# Patient Record
Sex: MALE | Race: BLACK OR AFRICAN AMERICAN | NOT HISPANIC OR LATINO | Employment: FULL TIME | URBAN - METROPOLITAN AREA
[De-identification: names, ages, dates, MRNs, and addresses within clinical notes are randomized per-mention and may not be internally consistent; named-entity substitution may affect disease eponyms.]

---

## 2018-01-11 NOTE — PROGRESS NOTES
Assessment    1  Well adult exam (V70 0) (Z00 00)   2  Screening for depression (V79 0) (Z13 89)   3  Hypercholesterolemia (272 0) (E78 00)   4  Prehypertension (796 2) (R03 0)   5  History of Anxiety (300 00) (F41 9)   6  Diabetes mellitus screening (V77 1) (Z13 1)    Plan  Diabetes mellitus screening, Hypercholesterolemia, Prehypertension    · (1) CBC/PLT/DIFF; Status:Active; Requested [id number];    · (1) COMPREHENSIVE METABOLIC PANEL; Status:Active; Requested [id number];    · (1) HEMOGLOBIN A1C; Status:Active; Requested for:27Oct2016;    · (1) LIPID PANEL FASTING W DIRECT LDL REFLEX; Status:Active; Requested  for:27Oct2016;    · (1) TSH WITH FT4 REFLEX; Status:Active; Requested for:27Oct2016;    · (1) URINALYSIS w URINE C/S REFLEX (will reflex a microscopy if leukocytes, occult  blood, or nitrites are not within normal limits); Status:Active; Requested [id number];   Screening for depression    · *VB-Depression Screening; Status:Complete;   Done: 57GTU1281 06:05PM  Well adult exam    · Call [phone] if: You have any warning signs of skin cancer ; Status:Complete;    Done: 27Oct2016   · Always use a seat belt and shoulder strap when riding or driving a motor vehicle ;  Status:Complete;   Done: 27Oct2016   · Begin or continue regular aerobic exercise   Gradually work up to at least 3 sessions of 30  minutes of exercise a week ; Status:Complete;   Done: 27Oct2016   · Brush your teeth 3 times a day and floss at least once a day ; Status:Complete;   Done:  27Oct2016   · Limit your use of alcohol to 2 drinks or cans of beer a day ; Status:Complete;   Done:  27Oct2016   · Regular aerobic exercise can help reduce stress ; Status:Complete;   Done: 27Oct2016   · There are many ways to reduce your risk of catching or spreading a sexually transmitted  Infection ; Status:Complete;   Done: 64VVJ1503   · Use a sun block product with an SPF of 15 or more ; Status:Complete;   Done:  27Oct2016   · Using a latex condom can help prevent pregnancy  It can also help to prevent the spread  of sexually transmitted infections ; Status:Complete;   Done: 27Oct2016   · We recommend routine visits to a dentist ; Status:Complete;   Done: 71XUL0370   · We recommend that you follow the "Mediterranean diet "; Status:Complete;   Done:  27Oct2016   · We recommend that you follow these rules for gun safety ; Status:Complete;   Done:  27Oct2016    Discussion/Summary  Impression: health maintenance visit  Currently, he eats an adequate diet and has an inadequate exercise regimen  Prostate cancer screening: PSA is not indicated  Testicular cancer screening: self testicular exam technique was taught, monthly self testicular exam was advised, testicular cancer screening is current and clinical testicular exam was done today  Colorectal cancer screening: colorectal cancer screening is not indicated  Screening lab work includes glucose, lipid profile and urinalysis  The immunizations are up to date  Advice and education were given regarding nutrition, aerobic exercise and weight loss  Preventative + overweight + prehypertension + hx hyperlipidemia: Weight loss + dietary measures discussed/encouraged  Slip given for repeat screening labs  Home BP readings advised, calling if consistently > 140/90  UTD with Tdap  Declining flu shot  Employer wellness form completed  RTO 1 year  Chief Complaint  Pt here for annual physical exam      History of Present Illness  HM, Adult Male: The patient is being seen for a health maintenance evaluation  The last health maintenance visit was 2 year(s) ago  Social History: Household members include parents  He is unmarried  Work status: working full time  The patient is a former cigarette smoker  He reports occasional alcohol use, drinking 2-4 drinks per week and denies binge drinking  The patient has no concerns about alcohol abuse  He has never used illicit drugs  General Health:  The patient's health since the last visit is described as good  He has regular dental visits  He denies vision problems  He denies hearing loss  Immunizations status: up to date  Lifestyle:  He consumes a diverse and healthy diet  He has weight concerns  He does not exercise regularly  He does not use tobacco  He consumes alcohol  He reports occasional alcohol use and drinking 2-4 drinks per week  He typically drinks beer  Alcohol concern: The patient has no concerns about alcohol abuse  He denies drug use  Reproductive health:  the patient is sexually active  He denies erectile dysfunction  Screening: cancer screening reviewed and updated  metabolic screening reviewed and updated  risk screening reviewed and updated  HPI:   Here for routine well exam  Needs employer wellness form completed  Feels fine, no complaints  Left thumb pain more or less resolved  Right ankle pops occasionally, but no pain or instability with this  Does not check blood pressure on his own  Both parents with hypertension, otherwise healthy  Still works full time at [employer]  Switched from night shift to day shift  Sleep issues have largely resolved as a result, as has anxiety  Good energy level  Lives with parents  Steady girlfriend  Average diet  Regular fruits and vegetables  Admits to liking potato chips  Recently switched to lower salt version  ETOH remains at 2-4 beers per week  Quit smoking 13 years ago  No regular exercise  Tdap 8/2016  Had recent eye exam  Wears glasses  Never got previously ordered labs done  Review of Systems    Constitutional: recent 5 lb weight gain, but no fever and not feeling tired  Eyes: no eyesight problems  ENT: no sore throat  Cardiovascular: no chest pain, no palpitations and no extremity edema  Respiratory: no cough and no shortness of breath during exertion     Gastrointestinal: no abdominal pain, no nausea, no vomiting, no constipation, no diarrhea and no blood in stools  Genitourinary: no dysuria  Musculoskeletal: as noted in HPI  Integumentary: no rashes  Neurological: no headache and no dizziness  Psychiatric: no anxiety and no depression  Endocrine: no muscle weakness  Hematologic/Lymphatic: no swollen glands  Over the past 2 weeks, how often have you been bothered by the following problems? 1 ) Little interest or pleasure in doing things? Not at all    2 ) Feeling down, depressed or hopeless? Not at all  Active Problems    1  History of Anxiety (300 00) (F41 9)   2  Epidermal inclusion cyst (706 2) (L72 0)   3  Hypercholesterolemia (272 0) (E78 00)   4  History of Injury of left thumb (959 5) (S69 92XA)   5  Need for Tdap vaccination (V06 1) (Z23)   6  Prehypertension (796 2) (R03 0)   7  History of Right knee pain (719 46) (M25 561)    Past Medical History    · History of Anxiety (300 00) (F41 9)   · Diabetes mellitus screening (V77 1) (Z13 1)   · History of allergic rhinitis (V12 69) (Z87 09)   · History of allergic urticaria (V13 3) (Z87 2)   · History of insomnia (V13 89) (Z87 898)   · History of low back pain (V13 59) (Z87 39)   · History of Injury of left thumb (959 5) (A08 20NA)   · Need for Tdap vaccination (V06 1) (Z23)   · Prehypertension (796 2) (R03 0)   · History of Right knee pain (719 46) (M25 561)   · History of Urethral discharge (788 7) (R36 9)   · Well adult exam (V70 0) (Z00 00)    Family History  Mother    · Family history of Hypertension (V17 49)  Father    · Family history of Hypertension (V17 49)  Paternal Grandfather    · Family history of Cancer   · Family history of Colon Cancer (V16 0)    Social History    · Being A Social Drinker   · Former smoker (Y96 08) (N00 497)   · Working Full Time    Current Meds   1  LORazepam 1 MG Oral Tablet; TAKE 1/2 TO 1 TAB TWICE A DAY AS NEEDED FOR   ANXIETY  AVOID WITH ALCOHOL; Therapy: 08MMY5193 to (Last Rx:24Mar2015) Ordered    Allergies    1   No Known Drug Allergies    Vitals   Recorded: 00RYN0519 06:32PM Recorded: 11OCE6520 91:84KI   Systolic 865 919   Diastolic 84 90   Heart Rate  85   Temperature  98 3 F   O2 Saturation  97   Weight  203 lb 4 96 oz     Physical Exam    Constitutional   General appearance: No acute distress, well appearing and well nourished  Head and Face   Head and face: Normal     Eyes   Conjunctiva and lids: No erythema, swelling or discharge  Pupils and irises: Equal, round, reactive to light  Ophthalmoscopic examination: Normal fundi and optic discs  Ears, Nose, Mouth, and Throat   External inspection of ears and nose: Normal     Otoscopic examination: Tympanic membranes translucent with normal light reflex  Canals patent without erythema  Nasal mucosa, septum, and turbinates: Normal without edema or erythema  Lips, teeth, and gums: Normal, good dentition  Oropharynx: Normal with no erythema, edema, exudate or lesions  Neck   Neck: Supple, symmetric, trachea midline, no masses  Thyroid: Normal, no thyromegaly  Pulmonary   Respiratory effort: No increased work of breathing or signs of respiratory distress  Auscultation of lungs: Clear to auscultation  Cardiovascular   Auscultation of heart: Normal rate and rhythm, normal S1 and S2, no murmurs  Carotid pulses: 2+ bilaterally  Pedal pulses: 2+ bilaterally  Examination of extremities for edema and/or varicosities: Normal     Chest   Breasts: Normal, no dimpling or skin changes appreciated  Palpation of breasts and axillae: Normal, no masses palpated  Abdomen   Abdomen: Non-tender, no masses  Liver and spleen: No hepatomegaly or splenomegaly  Examination for hernias: No hernias appreciated  Genitourinary   Scrotal contents: Normal testes, no masses  Penis: Normal, no lesions  Lymphatic   Palpation of lymph nodes in neck: No lymphadenopathy  Musculoskeletal   Gait and station: Normal     Neurologic   Reflexes: 2+ and symmetric  Psychiatric   Orientation to person, place and time: Normal     Mood and affect: Normal        Results/Data  *VB-Depression Screening 27Oct2016 06:05PM Jesus Counter     Test Name Result Flag Reference   Depression Scale Result      Depression Screen - Negative For Symptoms       Signatures   Electronically signed by : FRANK Villalta; Oct 27 2016  6:50PM EST                       (Author)    Electronically signed by : Randee Pennington DO; Oct 28 2016  7:55AM EST                       (Author)

## 2018-01-15 NOTE — RESULT NOTES
Verified Results  * [facility] THUMB LEFT FIRST DIGIT-MIN 2V [id number] 01:52PM Kin MARTELL Order Number: [id number]     Test Name Result Flag Reference   XR THUMB LEFT FIRST DIGIT-MIN 2V (Report)     LEFT THUMB     INDICATION: Laceration in the area of the 1st MCP joint  COMPARISON: None     VIEWS: PA view hand and 2 cone-down views of the 1st digit; 3 images     For the purposes of institution wide universal language the following terms will apply: (thumb=1st digit/finger, index finger=2nd digit/finger, long finger=3rd digit/finger, ring=4th digit/finger and small finger=5th digit/finger)     FINDINGS:     There is no acute fracture or dislocation  No significant degenerative changes  No lytic or blastic lesion  There is dorsal soft tissue swelling at the level of the MCP joint of the left thumb  There are no radiopaque foreign bodies  IMPRESSION:     No fracture  No radiopaque foreign bodies in the left thumb         Workstation performed: [id number]     Signed by:   Annie Mosqueda MD   8/19/16

## 2018-03-22 ENCOUNTER — OFFICE VISIT (OUTPATIENT)
Dept: FAMILY MEDICINE CLINIC | Facility: OTHER | Age: 38
End: 2018-03-22
Payer: COMMERCIAL

## 2018-03-22 VITALS
HEIGHT: 70 IN | HEART RATE: 76 BPM | DIASTOLIC BLOOD PRESSURE: 78 MMHG | SYSTOLIC BLOOD PRESSURE: 134 MMHG | BODY MASS INDEX: 29.13 KG/M2 | RESPIRATION RATE: 98 BRPM | WEIGHT: 203.5 LBS | TEMPERATURE: 99.3 F

## 2018-03-22 DIAGNOSIS — E78.00 HYPERCHOLESTEROLEMIA: ICD-10-CM

## 2018-03-22 DIAGNOSIS — E66.3 OVERWEIGHT: ICD-10-CM

## 2018-03-22 DIAGNOSIS — Z13.1 SCREENING FOR DIABETES MELLITUS: ICD-10-CM

## 2018-03-22 DIAGNOSIS — Z13.220 SCREENING FOR HYPERLIPIDEMIA: ICD-10-CM

## 2018-03-22 DIAGNOSIS — I10 MILD HYPERTENSION: ICD-10-CM

## 2018-03-22 DIAGNOSIS — Z00.00 WELL ADULT EXAM: Primary | ICD-10-CM

## 2018-03-22 PROCEDURE — 99395 PREV VISIT EST AGE 18-39: CPT | Performed by: NURSE PRACTITIONER

## 2018-03-22 NOTE — PROGRESS NOTES
Assessment/Plan:         Diagnoses and all orders for this visit:    Well adult exam + Overweight + Hx Hypercholesterolemia  --Dietary, weight loss measures discussed, encouraged  -     Lipid Panel with Direct LDL reflex; Future  -     HEMOGLOBIN A1C W/ EAG ESTIMATION; Future  -     TSH, 3rd generation with T4 reflex; Future    Mild hypertension (+ FH also)  --At this point, I recommended that he start a low dose antihypertensive, with goal /70 or less  As , a CCB or diuretic would be a good initial choice  He is, however, declining, preferring instead to work on dietary, weight loss interventions which were discussed  In particular I recommended he start a cardio exercise program, cut down on sodium intake, and work on losing weight  Also recommended periodic home BP readings, calling for consistently > 130/80     -     CBC and differential; Future  -     Comprehensive metabolic panel; Future  -     TSH, 3rd generation with T4 reflex; Future  -     Urinalysis with reflex to microscopic; Future    Abdominal wall strain  --Advise avoidance of potentially exacerbating activities  Also moist heat, OTC muscle cream, Motrin prn  --Call if no resolution/worsening, with these measures, over the next 2-3 weeks  Subjective:      Patient ID: Meredith Brar is a 44 y o  male  Here for routine well exam      Only complaint is some "muscle soreness" just below ribs on his right side  Present x 2-3 weeks  Insidious onset without known precipitating injury or activity  Has been having some issues with his right foot--upcoming podiatry visit--and is favoring that side a bit  He wonders if this may be a factor  Job involves some lifting, trunk movements  Continues to work full time days for [employer]  No variation of symptoms with eating  No rash, bulge noted  N/V, abdominal pain, chest pain, shortness of breath, urinary changes       Never got routine blood work done, as ordered previously  No recent home BP checks  Diet remains fair  Some fruits and vegetables  Admits to liking salty foods, potato chips in particular  Lifts weights but no regular cardio activity  Lives with steady girlfriend  No plans for marriage at this time  Quit smoking 15 years ago  ETOH: 2-4 beers per week  No liquor  No drug use  Tdap 8/2016  Wears glasses  Rx UTD  The following portions of the patient's history were reviewed and updated as appropriate: allergies, current medications, past family history, past medical history, past social history, past surgical history and problem list     Review of Systems   Constitutional: Negative for fatigue and fever  HENT: Negative for sore throat  Eyes: Negative for visual disturbance  Respiratory: Negative for cough and shortness of breath  Cardiovascular: Negative for chest pain and palpitations  Gastrointestinal: Negative for abdominal pain, blood in stool, constipation, diarrhea, nausea and vomiting  Genitourinary: Negative for difficulty urinating  Musculoskeletal: Negative for arthralgias  Skin: Negative  Neurological: Negative for dizziness and headaches  Hematological: Negative for adenopathy  Psychiatric/Behavioral: Negative for agitation and dysphoric mood  Objective:      /92 (BP Location: Right arm, Patient Position: Sitting, Cuff Size: Adult)   Pulse 76   Temp 99 3 °F (37 4 °C) (Tympanic)   Resp (!) 98   Ht 5' 10" (1 778 m)   Wt 92 3 kg (203 lb 8 oz)   BMI 29 20 kg/m²          Physical Exam   Constitutional: He is oriented to person, place, and time  He appears well-developed and well-nourished  HENT:   Head: Normocephalic and atraumatic  Right Ear: External ear normal    Left Ear: External ear normal    Nose: Nose normal    Mouth/Throat: Oropharynx is clear and moist    Eyes: Conjunctivae are normal  Pupils are equal, round, and reactive to light  Neck: Normal range of motion  Neck supple  No thyromegaly present  Cardiovascular: Normal rate, regular rhythm, normal heart sounds and intact distal pulses  Pulmonary/Chest: Effort normal and breath sounds normal    Abdominal: Soft  Bowel sounds are normal  He exhibits no mass  There is no rebound and no guarding  Hernia confirmed negative in the right inguinal area and confirmed negative in the left inguinal area  Area of symptomatology (right side, just below lower margin of ribs), with mild tenderness, no visualized or palpable abnormality  Ribs themselves, nontender  Abdomen non-tender elsewhere  No guarding, rebound, CVA tenderness  Negative Cisneros sign  Genitourinary: Testes normal and penis normal    Neurological: He is alert and oriented to person, place, and time  He has normal reflexes  Skin: Skin is warm and dry  Psychiatric: He has a normal mood and affect

## 2018-03-22 NOTE — PATIENT INSTRUCTIONS
Wellness Visit for Adults   WHAT YOU NEED TO KNOW:   What is a wellness visit? A wellness visit is when you see your healthcare provider to get screened for health problems  You can also get advice on how to stay healthy  Write down your questions so you remember to ask them  Ask your healthcare provider how often you should have a wellness visit  What happens at a wellness visit? Your healthcare provider will ask about your health, and your family history of health problems  This includes high blood pressure, heart disease, and cancer  He or she will ask if you have symptoms that concern you, if you smoke, and about your mood  You may also be asked about your intake of medicines, supplements, food, and alcohol  Any of the following may be done:  · Your weight  will be checked  Your height may also be checked so your body mass index (BMI) can be calculated  Your BMI shows if you are at a healthy weight  · Your blood pressure  and heart rate will be checked  Your temperature may also be checked  · Blood and urine tests  may be done  Blood tests may be done to check your cholesterol levels  Abnormal cholesterol levels increase your risk for heart disease and stroke  You may also need a blood or urine test to check for diabetes if you are at increased risk  Urine tests may be done to look for signs of an infection or kidney disease  · A physical exam  includes checking your heartbeat and lungs with a stethoscope  Your healthcare provider may also check your skin to look for sun damage  · Screening tests  may be recommended  A screening test is done to check for diseases that may not cause symptoms  The screening tests you may need depend on your age, gender, family history, and lifestyle habits  For example, colorectal screening may be recommended if you are 48years old or older  What screening tests do I need if I am a woman? · A Pap smear  is used to screen for cervical cancer   Pap smears are usually done every 3 to 5 years depending on your age  You may need them more often if you have had abnormal Pap smear test results in the past  Ask your healthcare provider how often you should have a Pap smear  · A mammogram  is an x-ray of your breasts to screen for breast cancer  Experts recommend mammograms every 2 years starting at age 48 years  You may need a mammogram at age 52 years or younger if you have an increased risk for breast cancer  Talk to your healthcare provider about when you should start having mammograms and how often you need them  What vaccines might I need? · Get an influenza vaccine  every year  The influenza vaccine protects you from the flu  Several types of viruses cause the flu  The viruses change over time, so new vaccines are made each year  · Get a tetanus-diphtheria (Td) booster vaccine  every 10 years  This vaccine protects you against tetanus and diphtheria  Tetanus is a severe infection that may cause painful muscle spasms and lockjaw  Diphtheria is a severe bacterial infection that causes a thick covering in the back of your mouth and throat  · Get a human papillomavirus (HPV) vaccine  if you are female and aged 23 to 32 or male 23 to 24 and never received it  This vaccine protects you from HPV infection  HPV is the most common infection spread by sexual contact  HPV may also cause vaginal, penile, and anal cancers  · Get a pneumococcal vaccine  if you are aged 72 years or older  The pneumococcal vaccine is an injection given to protect you from pneumococcal disease  Pneumococcal disease is an infection caused by pneumococcal bacteria  The infection may cause pneumonia, meningitis, or an ear infection  · Get a shingles vaccine  if you are aged 61 or older, even if you have had shingles before  The shingles vaccine is an injection to protect you from the varicella-zoster virus  This is the same virus that causes chickenpox   Shingles is a painful rash that develops in people who had chickenpox or have been exposed to the virus  How can I eat healthy? My Plate is a model for planning healthy meals  It shows the types and amounts of foods that should go on your plate  Fruits and vegetables make up about half of your plate, and grains and protein make up the other half  A serving of dairy is included on the side of your plate  The amount of calories and serving sizes you need depends on your age, gender, weight, and height  Examples of healthy foods are listed below:  · Eat a variety of vegetables  such as dark green, red, and orange vegetables  You can also include canned vegetables low in sodium (salt) and frozen vegetables without added butter or sauces  · Eat a variety of fresh fruits , canned fruit in 100% juice, frozen fruit, and dried fruit  · Include whole grains  At least half of the grains you eat should be whole grains  Examples include whole-wheat bread, wheat pasta, brown rice, and whole-grain cereals such as oatmeal     · Eat a variety of protein foods such as seafood (fish and shellfish), lean meat, and poultry without skin (turkey and chicken)  Examples of lean meats include pork leg, shoulder, or tenderloin, and beef round, sirloin, tenderloin, and extra lean ground beef  Other protein foods include eggs and egg substitutes, beans, peas, soy products, nuts, and seeds  · Choose low-fat dairy products such as skim or 1% milk or low-fat yogurt, cheese, and cottage cheese  · Limit unhealthy fats  such as butter, hard margarine, and shortening  How much exercise do I need? Exercise at least 30 minutes per day on most days of the week  Some examples of exercise include walking, biking, dancing, and swimming  You can also fit in more physical activity by taking the stairs instead of the elevator or parking farther away from stores  Include muscle strengthening activities 2 days each week  Regular exercise provides many health benefits  It helps you manage your weight, and decreases your risk for type 2 diabetes, heart disease, stroke, and high blood pressure  Exercise can also help improve your mood  Ask your healthcare provider about the best exercise plan for you  What are some general health and safety guidelines I should follow? · Do not smoke  Nicotine and other chemicals in cigarettes and cigars can cause lung damage  Ask your healthcare provider for information if you currently smoke and need help to quit  E-cigarettes or smokeless tobacco still contain nicotine  Talk to your healthcare provider before you use these products  · Limit alcohol  A drink of alcohol is 12 ounces of beer, 5 ounces of wine, or 1½ ounces of liquor  · Lose weight, if needed  Being overweight increases your risk of certain health conditions  These include heart disease, high blood pressure, type 2 diabetes, and certain types of cancer  · Protect your skin  Do not sunbathe or use tanning beds  Use sunscreen with a SPF 15 or higher  Apply sunscreen at least 15 minutes before you go outside  Reapply sunscreen every 2 hours  Wear protective clothing, hats, and sunglasses when you are outside  · Drive safely  Always wear your seatbelt  Make sure everyone in your car wears a seatbelt  A seatbelt can save your life if you are in an accident  Do not use your cell phone when you are driving  This could distract you and cause an accident  Pull over if you need to make a call or send a text message  · Practice safe sex  Use latex condoms if are sexually active and have more than one partner  Your healthcare provider may recommend screening tests for sexually transmitted infections (STIs)  · Wear helmets, lifejackets, and protective gear  Always wear a helmet when you ride a bike or motorcycle, go skiing, or play sports that could cause a head injury  Wear protective equipment when you play sports   Wear a lifejacket when you are on a boat or doing water sports  CARE AGREEMENT:   You have the right to help plan your care  Learn about your health condition and how it may be treated  Discuss treatment options with your caregivers to decide what care you want to receive  You always have the right to refuse treatment  The above information is an  only  It is not intended as medical advice for individual conditions or treatments  Talk to your doctor, nurse or pharmacist before following any medical regimen to see if it is safe and effective for you  © 2017 [employer]  Information is for End User's use only and may not be sold, redistributed or otherwise used for commercial purposes  All illustrations and images included in CareNotes® are the copyrighted property of [employer]  or [employer]  Chronic Hypertension   WHAT YOU NEED TO KNOW:   Hypertension is high blood pressure (BP)  Your BP is the force of your blood moving against the walls of your arteries  Normal BP is less than 120/80  Prehypertension is between 120/80 and 139/89  Hypertension is 140/90 or higher  Hypertension causes your BP to get so high that your heart has to work much harder than normal  This can damage your heart  Chronic hypertension is a long-term condition that you can control with a healthy lifestyle or medicines  A controlled blood pressure helps protect your organs, such as your heart, lungs, brain, and kidneys  DISCHARGE INSTRUCTIONS:   Call 911 for any of the following:   · You have discomfort in your chest that feels like squeezing, pressure, fullness, or pain  · You become confused or have difficulty speaking  · You suddenly feel lightheaded or have trouble breathing  · You have pain or discomfort in your back, neck, jaw, stomach, or arm  Return to the emergency department if:   · You have a severe headache or vision loss  · You have weakness in an arm or leg    Contact your healthcare provider if:   · You feel faint, dizzy, confused, or drowsy  · You have been taking your BP medicine and your BP is still higher than your healthcare provider says it should be  · You have questions or concerns about your condition or care  Medicines: You may need any of the following:  · Medicine  may be used to help lower your BP  You may need more than one type of medicine  Take the medicine exactly as directed  · Diuretics  help decrease extra fluid that collects in your body  This will help lower your BP  You may urinate more often while you take this medicine  · Cholesterol medicine  helps lower your cholesterol level  A low cholesterol level helps prevent heart disease and makes it easier to control your blood pressure  · Take your medicine as directed  Contact your healthcare provider if you think your medicine is not helping or if you have side effects  Tell him or her if you are allergic to any medicine  Keep a list of the medicines, vitamins, and herbs you take  Include the amounts, and when and why you take them  Bring the list or the pill bottles to follow-up visits  Carry your medicine list with you in case of an emergency  Follow up with your healthcare provider as directed: You will need to return to have your blood pressure checked and to have other lab tests done  Write down your questions so you remember to ask them during your visits  Manage chronic hypertension:  Talk with your healthcare provider about these and other ways to manage hypertension:  · Take your BP at home  Sit and rest for 5 minutes before you take your BP  Extend your arm and support it on a flat surface  Your arm should be at the same level as your heart  Follow the directions that came with your BP monitor  If possible, take at least 2 BP readings each time  Take your BP at least twice a day at the same times each day, such as morning and evening  Keep a record of your BP readings and bring it to your follow-up visits   Ask your healthcare provider what your blood pressure should be  · Limit sodium (salt) as directed  Too much sodium can affect your fluid balance  Check labels to find low-sodium or no-salt-added foods  Some low-sodium foods use potassium salts for flavor  Too much potassium can also cause health problems  Your healthcare provider will tell you how much sodium and potassium are safe for you to have in a day  He or she may recommend that you limit sodium to 2,300 mg a day  · Follow the meal plan recommended by your healthcare provider  A dietitian or your provider can give you more information on low-sodium plans or the DASH (Dietary Approaches to Stop Hypertension) eating plan  The DASH plan is low in sodium, unhealthy fats, and total fat  It is high in potassium, calcium, and fiber  · Exercise to maintain a healthy weight  Exercise at least 30 minutes per day, on most days of the week  This will help decrease your blood pressure  Ask about the best exercise plan for you  · Decrease stress  This may help lower your BP  Learn ways to relax, such as deep breathing or listening to music  · Limit alcohol  Women should limit alcohol to 1 drink a day  Men should limit alcohol to 2 drinks a day  A drink of alcohol is 12 ounces of beer, 5 ounces of wine, or 1½ ounces of liquor  · Do not smoke  Nicotine and other chemicals in cigarettes and cigars can increase your BP and also cause lung damage  Ask your healthcare provider for information if you currently smoke and need help to quit  E-cigarettes or smokeless tobacco still contain nicotine  Talk to your healthcare provider before you use these products  © 2017 [address] [facility] Information is for End User's use only and may not be sold, redistributed or otherwise used for commercial purposes  All illustrations and images included in CareNotes® are the copyrighted property of [employer]  or [employer]    The above information is an  only  It is not intended as medical advice for individual conditions or treatments  Talk to your doctor, nurse or pharmacist before following any medical regimen to see if it is safe and effective for you

## 2018-05-03 ENCOUNTER — OFFICE VISIT (OUTPATIENT)
Dept: OBGYN CLINIC | Facility: HOSPITAL | Age: 38
End: 2018-05-03
Payer: COMMERCIAL

## 2018-05-03 VITALS
BODY MASS INDEX: 29.35 KG/M2 | HEIGHT: 70 IN | WEIGHT: 205 LBS | HEART RATE: 97 BPM | SYSTOLIC BLOOD PRESSURE: 128 MMHG | DIASTOLIC BLOOD PRESSURE: 85 MMHG

## 2018-05-03 DIAGNOSIS — G56.21 CUBITAL TUNNEL SYNDROME ON RIGHT: Primary | ICD-10-CM

## 2018-05-03 PROCEDURE — 99203 OFFICE O/P NEW LOW 30 MIN: CPT | Performed by: PHYSICIAN ASSISTANT

## 2018-05-03 RX ORDER — METHYLPREDNISOLONE 4 MG/1
TABLET ORAL
Qty: 21 TABLET | Refills: 0 | Status: SHIPPED | OUTPATIENT
Start: 2018-05-03 | End: 2020-03-02

## 2018-05-03 NOTE — PROGRESS NOTES
Assessment/Plan   Diagnoses and all orders for this visit:    Cubital tunnel syndrome on right  -     EMG 1 Upper/1 Lower Neuropathy; Future  -     Methylprednisolone 4 MG TBPK; Use as directed on package          Subjective   Patient ID: Julienne Barnard is a 44 y o  male  Vitals:    05/03/18 0948   BP: 128/85   Pulse: 80     36yo male comes in for an evaluation of his right arm  For about a month, he has been having numbness and paresthesias in the ulnar forearm and ring and small fingers  No injury  This is worst in the am when he wakes up and it also increases if he has his elbow flexed for an extended period, such as with computer work  He occasionally gets similar symptoms on the left, but these are much milder, transient, and not present now  No symptoms ever in the upper arm or neck  He has noticed some weakness but is not dropping things  He works as a   The pain is dull, minimal, and radiates from the elbow to the lateral fingers  He has not had any treatment  He reports he had a normal xray of the elbow  The following portions of the patient's history were reviewed and updated as appropriate: allergies, current medications, past family history, past medical history, past social history, past surgical history and problem list     Review of Systems  Ortho Exam  Past Medical History:   Diagnosis Date    Anxiety     last assessed: 10/27/2016    Epidermal inclusion cyst     last assessed: 10/7/2016    Hypercholesterolemia 08/20/2008    last assessed: 10/27/2016    Prehypertension     last assessed: 10/27/2016     History reviewed  No pertinent surgical history    Family History   Problem Relation Age of Onset    Hypertension Mother     Hypertension Father     Cancer Paternal Grandfather     Colon cancer Paternal Grandfather      Social History     Occupational History    working full time      Social History Main Topics    Smoking status: Former Smoker    Smokeless tobacco: Former User    Alcohol use Yes      Comment: being a social drinker    Drug use: Unknown    Sexual activity: Not on file       Review of Systems   Constitutional: Negative  HENT: Negative  Eyes: Negative  Respiratory: Negative  Cardiovascular: Negative  Gastrointestinal: Negative  Endocrine: Negative  Genitourinary: Negative  Musculoskeletal: As below      Allergic/Immunologic: Negative  Neurological: Negative  Hematological: Negative  Psychiatric/Behavioral: Negative          Objective   Physical Exam            · Constitutional: Awake, Alert, Oriented  · Eyes: EOMI  · Psych: Mood and affect appropriate  · Heart: regular rate and rhythm  · Lungs: No audible wheezing  · Abdomen: soft  · Lymph: no lymphedema       bilateral Elbow:  - Appearance   No swelling, discoloration, deformity, or ecchymosis  - Palpation   "No tenderness to palpation of the medial / lateral epicondyles, olecranon, radial head, or anterior elbow  - ROM   Extension: 0 and Flexion: 130  - Motor   normal 5/5 in all planes, elbows and wrists  - Special Tests   No instability with valgus / varus stress or pain with resisted wrist motion  - Mildly diminished sensation to light touch in the right ring and small fingers, negative tinel's at the wrist and also negative at the elbows

## 2018-05-07 ENCOUNTER — HOSPITAL ENCOUNTER (EMERGENCY)
Facility: HOSPITAL | Age: 38
Discharge: HOME | End: 2018-05-07
Attending: EMERGENCY MEDICINE
Payer: COMMERCIAL

## 2018-05-07 ENCOUNTER — APPOINTMENT (EMERGENCY)
Dept: RADIOLOGY | Facility: HOSPITAL | Age: 38
End: 2018-05-07
Payer: COMMERCIAL

## 2018-05-07 VITALS
OXYGEN SATURATION: 96 % | DIASTOLIC BLOOD PRESSURE: 80 MMHG | HEART RATE: 102 BPM | BODY MASS INDEX: 30.21 KG/M2 | WEIGHT: 210.54 LBS | TEMPERATURE: 98.1 F | RESPIRATION RATE: 20 BRPM | SYSTOLIC BLOOD PRESSURE: 166 MMHG

## 2018-05-07 DIAGNOSIS — R06.02 SHORTNESS OF BREATH: Primary | ICD-10-CM

## 2018-05-07 PROCEDURE — 71046 X-RAY EXAM CHEST 2 VIEWS: CPT

## 2018-05-07 PROCEDURE — 93005 ELECTROCARDIOGRAM TRACING: CPT

## 2018-05-07 PROCEDURE — 99285 EMERGENCY DEPT VISIT HI MDM: CPT

## 2018-05-07 RX ORDER — AZITHROMYCIN 250 MG/1
TABLET, FILM COATED ORAL
Qty: 6 TABLET | Refills: 0 | Status: SHIPPED | OUTPATIENT
Start: 2018-05-07 | End: 2018-05-07

## 2018-05-07 RX ORDER — AZITHROMYCIN 250 MG/1
250 TABLET, FILM COATED ORAL EVERY 24 HOURS
Qty: 4 TABLET | Refills: 0 | Status: SHIPPED | OUTPATIENT
Start: 2018-05-07 | End: 2018-05-11

## 2018-05-07 RX ORDER — AZITHROMYCIN 250 MG/1
500 TABLET, FILM COATED ORAL ONCE
Status: COMPLETED | OUTPATIENT
Start: 2018-05-07 | End: 2018-05-07

## 2018-05-07 RX ORDER — SUCRALFATE ORAL 1 G/10ML
1000 SUSPENSION ORAL ONCE
Status: COMPLETED | OUTPATIENT
Start: 2018-05-07 | End: 2018-05-07

## 2018-05-07 RX ADMIN — SUCRALFATE 1000 MG: 1 SUSPENSION ORAL at 02:59

## 2018-05-07 RX ADMIN — AZITHROMYCIN 500 MG: 250 TABLET, FILM COATED ORAL at 04:07

## 2018-05-07 NOTE — ED PROVIDER NOTES
History  Chief Complaint   Patient presents with    Shortness of Breath     pt presents with cough that woke him up out of sleep, and reports after his coughing fit, couldnt catch breath, denies chest pain  no cardiac or resp history  states he felt fine prior to going to bed     HPI     Patient is a pleasant 44year old male who woke up today with a sensation of throat swelling and shortness of breath  History of high cholesterol  No chest pain  No current sob  No sweats, vomiting or chest pain  No f/c/s  No n/v/d  He notes a prior history of similar events 4-5 months ago  No known cause  Currently asymptomatic except for mild anxious feeling  No sob or cp currently  No history of dvt or pe, no leg swelling, no recent surgeries, no long flights, no cancer, no hemoptysis  The patient (and any family present) verbalized understanding of the discharge instructions and warnings that would necessitate return to the Emergency Department  Gave verbal in addition to written discharge instructions  Specifically highlighted areas of special concern regarding the written and verbal discharge instructions and return precautions  All questions were answered prior to discharge  Prior to Admission Medications   Prescriptions Last Dose Informant Patient Reported? Taking? Methylprednisolone 4 MG TBPK   No Yes   Sig: Use as directed on package      Facility-Administered Medications: None       Past Medical History:   Diagnosis Date    Anxiety     last assessed: 10/27/2016    Epidermal inclusion cyst     last assessed: 10/7/2016    Hypercholesterolemia 08/20/2008    last assessed: 10/27/2016    Prehypertension     last assessed: 10/27/2016       No past surgical history on file      Family History   Problem Relation Age of Onset    Hypertension Mother     Hypertension Father     Cancer Paternal Flavia [place]ota     Colon cancer Paternal Grandfather      I have reviewed and agree with the history as documented  Social History   Substance Use Topics    Smoking status: Former Smoker    Smokeless tobacco: Former User    Alcohol use Yes      Comment: being a social drinker        Review of Systems   Constitutional: Negative for chills and fever  Respiratory: Negative for chest tightness and shortness of breath  All other systems reviewed and are negative  Physical Exam  ED Triage Vitals [05/07/18 0215]   Temperature Pulse Respirations Blood Pressure SpO2   98 1 °F (36 7 °C) 102 20 166/80 96 %      Temp Source Heart Rate Source Patient Position - Orthostatic VS BP Location FiO2 (%)   Oral Monitor Sitting Left arm --      Pain Score       --           Orthostatic Vital Signs  Vitals:    05/07/18 0215   BP: 166/80   Pulse: 102   Patient Position - Orthostatic VS: Sitting       Physical Exam   Constitutional: He is oriented to person, place, and time  He appears well-developed and well-nourished  HENT:   Head: Normocephalic and atraumatic  Eyes: EOM are normal  Pupils are equal, round, and reactive to light  Neck: Normal range of motion  Neck supple  Cardiovascular: Normal rate, regular rhythm and normal heart sounds  No murmur heard  Pulmonary/Chest: Effort normal and breath sounds normal  No respiratory distress  He has no wheezes  Abdominal: Soft  Bowel sounds are normal  He exhibits no distension  There is no tenderness  Musculoskeletal: Normal range of motion  He exhibits no edema or tenderness  Neurological: He is alert and oriented to person, place, and time  No cranial nerve deficit  Coordination normal    Skin: Skin is warm and dry  He is not diaphoretic  No erythema  Psychiatric: He has a normal mood and affect  His behavior is normal    Nursing note and vitals reviewed        ED Medications  Medications   sucralfate (CARAFATE) oral suspension 1,000 mg (1,000 mg Oral Given 5/7/18 0460)   azithromycin (ZITHROMAX) tablet 500 mg (500 mg Oral Given 5/7/18 7434) Diagnostic Studies  Results Reviewed     None                 XR chest 2 views    (Results Pending)              Procedures  Procedures       Phone Contacts  ED Phone Contact    ED Course                               MDM  CritCare Time    Disposition  Final diagnoses:   Shortness of breath     Time reflects when diagnosis was documented in both MDM as applicable and the Disposition within this note     Time User Action Codes Description Comment    5/7/2018  4:00 AM Nabil Lawrence Add [R06 02] Shortness of breath       ED Disposition     ED Disposition Condition Comment    Discharge  Milo Egan discharge to home/self care  Condition at discharge: Good        Follow-up Information     Follow up With Specialties Details Why Contact Info    Primary Care Doctor  In 1 day          Discharge Medication List as of 5/7/2018  4:05 AM      START taking these medications    Details   azithromycin (ZITHROMAX) 250 mg tablet Take 1 tablet (250 mg total) by mouth every 24 hours for 4 days Take 2 tablets today then 1 tablet daily x 4 days, Starting Mon 5/7/2018, Until Fri 5/11/2018, Print         CONTINUE these medications which have NOT CHANGED    Details   Methylprednisolone 4 MG TBPK Use as directed on package, Normal           No discharge procedures on file      ED Provider  Electronically Signed by           Marci Pearl MD  05/07/18 5451

## 2018-05-07 NOTE — DISCHARGE INSTRUCTIONS
Shortness of Breath   WHAT YOU NEED TO KNOW:   Shortness of breath is a feeling that you cannot get enough air when you breathe in  You may have this feeling only during activity, or all the time  Your symptoms can range from mild to severe  Shortness of breath may be a sign of a serious health condition that needs immediate care  DISCHARGE INSTRUCTIONS:   Return to the emergency department if:   · Your signs and symptoms are the same or worse within 24 hours of treatment  · The skin over your ribs or on your neck sinks in when you breathe  · You feel confused or dizzy  Contact your healthcare provider if:   · You have new or worsening symptoms  · You have questions or concerns about your condition or care  Medicines:   · Medicines  may be used to treat the cause of your symptoms  You may need medicine to treat a bacterial infection or reduce anxiety  Other medicines may be used to open your airway, reduce swelling, or remove extra fluid  If you have a heart condition, you may need medicine to help your heart beat more strongly or regularly  · Take your medicine as directed  Contact your healthcare provider if you think your medicine is not helping or if you have side effects  Tell him or her if you are allergic to any medicine  Keep a list of the medicines, vitamins, and herbs you take  Include the amounts, and when and why you take them  Bring the list or the pill bottles to follow-up visits  Carry your medicine list with you in case of an emergency  Manage shortness of breath:   · Create an action plan  You and your healthcare provider can work together to create a plan for how to handle shortness of breath  The plan can include daily activities, treatment changes, and what to do if you have severe breathing problems  · Lean forward on your elbows when you sit  This helps your lungs expand and may make it easier to breathe  · Use pursed-lip breathing any time you feel short of breath  Breathe in through your nose and then slowly breathe out through your mouth with your lips slightly puckered  It should take you twice as long to breathe out as it did to breathe in  · Do not smoke  Nicotine and other chemicals in cigarettes and cigars can cause lung damage and make shortness of breath worse  Ask your healthcare provider for information if you currently smoke and need help to quit  E-cigarettes or smokeless tobacco still contain nicotine  Talk to your healthcare provider before you use these products  · Reach or maintain a healthy weight  Your healthcare provider can help you create a safe weight loss plan if you are overweight  · Exercise as directed  Exercise can help your lungs work more easily  Exercise can also help you lose weight if needed  Try to get at least 30 minutes of exercise most days of the week  Follow up with your healthcare provider or specialist as directed:  Write down your questions so you remember to ask them during your visits  © 2017 [employer] Information is for End User's use only and may not be sold, redistributed or otherwise used for commercial purposes  All illustrations and images included in CareNotes® are the copyrighted property of [employer]  or [employer]  The above information is an  only  It is not intended as medical advice for individual conditions or treatments  Talk to your doctor, nurse or pharmacist before following any medical regimen to see if it is safe and effective for you

## 2018-05-08 LAB
ATRIAL RATE: 96 BPM
P AXIS: 61 DEGREES
PR INTERVAL: 178 MS
QRS AXIS: 60 DEGREES
QRSD INTERVAL: 86 MS
QT INTERVAL: 340 MS
QTC INTERVAL: 429 MS
T WAVE AXIS: 40 DEGREES
VENTRICULAR RATE: 96 BPM

## 2018-05-08 PROCEDURE — 93010 ELECTROCARDIOGRAM REPORT: CPT | Performed by: INTERNAL MEDICINE

## 2018-06-18 ENCOUNTER — HOSPITAL ENCOUNTER (OUTPATIENT)
Dept: RADIOLOGY | Facility: CLINIC | Age: 38
Discharge: HOME | End: 2018-06-18
Payer: COMMERCIAL

## 2018-06-18 DIAGNOSIS — G56.21 CUBITAL TUNNEL SYNDROME ON RIGHT: ICD-10-CM

## 2018-06-18 PROCEDURE — 95860 NEEDLE EMG 1 EXTREMITY: CPT | Performed by: PHYSICAL MEDICINE & REHABILITATION

## 2018-06-18 PROCEDURE — 95908 NRV CNDJ TST 3-4 STUDIES: CPT | Performed by: PHYSICAL MEDICINE & REHABILITATION

## 2018-06-18 PROCEDURE — 95886 MUSC TEST DONE W/N TEST COMP: CPT | Performed by: PHYSICAL MEDICINE & REHABILITATION

## 2018-06-18 NOTE — PROCEDURES
Procedures      Electromyogram and Nerve Conduction Velocity Procedure Note    HX:  56-year-old right-hand-dominant male referred by Orthopedics for electrodiagnostic study of the symptomatic right upper extremity  He reports 3-4 months of intermittent waxing waning numbness in the right ulnar territory  No associated neck or shoulder pain  No history of trauma  He does reports symptoms are aggravated by elbow flexion as well as she is sitting using an arm rest on the right  Presents today for electrodiagnostic study     PMH:  He denies any ongoing medical problems    Exam:   Reflex, motor, and sensory testing are entirely normal   Tinel sign is negative over the median and ulnar nerves at the wrists and elbow  There are no long tract signs, no fasciculations, tremors, or atrophy  Procedure:  Verbal informed consent was obtained as with all electrodiagnostic medicine patients  As with all patients this patient was informed that they may terminate the  examine at any time  Patient tolerated the procedure well with no adverse effects reported or observed  Findings:  Please see the RE[facility] data printout  The only abnormality on today's study was greater than 10 meters/second drop in motor fiber conduction of the ulnar nerve across the elbow or the absolute value remains within normal range as reported in the literature  Electromyography:  Monopolar needle exploration failed to reveal any abnormal spontaneous potentials the following muscles in the symptomatic right side:  Cervical paraspinals, deltoid, biceps, triceps, brachioradialis, pronator teres, 1st dorsal interosseous, abductor pollicis brevis  Motor units were normal for amplitude, duration, and configuration throughout as well as recruitment  Conclusion:   1  There is evidence of mild but significant ulnar motor fiber slowing through the region of the elbow    There is a greater than 10 meters/second drop relative to the conduction to the forearm but the absolute value remains within the normal range  At this time there is no evidence of any sensory fiber loss or motor axonal injury  2   There is no electrophysiologic data to indicate or suggest a cervical radiculopathy, plexopathy, or large fiber polyneuropathy  Median nerve function is entirely normal at this time  Recommendations:       Careful clinical correlation is advised

## 2018-06-25 ENCOUNTER — OFFICE VISIT (OUTPATIENT)
Dept: OBGYN CLINIC | Facility: CLINIC | Age: 38
End: 2018-06-25
Payer: COMMERCIAL

## 2018-06-25 VITALS
WEIGHT: 200 LBS | HEART RATE: 105 BPM | DIASTOLIC BLOOD PRESSURE: 85 MMHG | BODY MASS INDEX: 28.63 KG/M2 | HEIGHT: 70 IN | SYSTOLIC BLOOD PRESSURE: 150 MMHG

## 2018-06-25 DIAGNOSIS — G56.21 CUBITAL TUNNEL SYNDROME ON RIGHT: Primary | ICD-10-CM

## 2018-06-25 PROCEDURE — 99214 OFFICE O/P EST MOD 30 MIN: CPT | Performed by: ORTHOPAEDIC SURGERY

## 2018-06-25 NOTE — PROGRESS NOTES
ASSESSMENT/PLAN:    Diagnoses and all orders for this visit:    Cubital tunnel syndrome on right  -     Ambulatory referral to PT/OT hand therapy; Future        Assessment:   Cubital Tunnel Syndrome  right    Plan:   therapy and bracing    Follow Up:  2  month(s)    To Do Next Visit:       General Discussions:  Cubital Tunnel Syndrome: The anatomy and physiology of cubital tunnel syndrome were discussed with the patient today in the office  Typically, increased elbow flexion activities decrease blood flow within the intraneural spaces, resulting in a feeling of numbness, tingling, weakness, or clumsiness within the hand and fingers  Occasionally, anatomic structures such as medial elbow osteophytes, the medial head of the triceps, were subluxing ulnar nerve may result in increased pressure or aggravation at the cubital tunnel  Typical signs and symptoms usually include numbness and tingling within the ring and small finger, weakness with , and weakness with pinch  Conservative treatment and includes nocturnal bracing to keep the elbow in a semi-extended position, activity modification, therapy, and avoiding excessive elbow flexion activities  A majority of patients typically respond to conservative treatment over a period of approximately 3-6 months  EMG/NCV testing of the ulnar nerve at the elbow is not as reliable as carpal tunnel syndrome  Surgical intervention in the form of in situ release of the ulnar nerve at the elbow or ulnar nerve transposition may be required in up to 20% of patients  Operative Discussions:         _____________________________________________________  CHIEF COMPLAINT:  Chief Complaint   Patient presents with    Right Hand - Numbness    Right Elbow - Numbness         SUBJECTIVE:  Juju Owusu is a 43y o  year old male who presents with Numbness to the right ring finger and small finger  This started  4 month(s) ago as Insidious   Pt states that he notices when his elbow is bent he will experience numbness and tingling to his ring and small finger and he also notices when his elbow is straight this improves his symptoms  Pt states that this is not waking him from sleep at night currently but states that it was when he initially came to get looked at  Radiation: Yes to the  ring finger and small finger  Previous Treatments: activity modification with only partial relief  Associated symptoms: No Complaints    PAST MEDICAL HISTORY:  Past Medical History:   Diagnosis Date    Anxiety     last assessed: 10/27/2016    Epidermal inclusion cyst     last assessed: 10/7/2016    Hypercholesterolemia 08/20/2008    last assessed: 10/27/2016    Prehypertension     last assessed: 10/27/2016       PAST SURGICAL HISTORY:  No past surgical history on file  FAMILY HISTORY:  Family History   Problem Relation Age of Onset    Hypertension Mother     Hypertension Father     Cancer Paternal Grandfather     Colon cancer Paternal Grandfather        SOCIAL HISTORY:  Social History   Substance Use Topics    Smoking status: Former Smoker    Smokeless tobacco: Former User    Alcohol use Yes      Comment: being a social drinker       MEDICATIONS:    Current Outpatient Prescriptions:     Methylprednisolone 4 MG TBPK, Use as directed on package, Disp: 21 tablet, Rfl: 0    ALLERGIES:  Allergies   Allergen Reactions    Sesame Seed (Diagnostic) Itching and Swelling       REVIEW OF SYSTEMS:  Pertinent items are noted in HPI      LABS:  HgA1c: No results found for: HGBA1C  BMP: No results found for: GLUCOSE, CALCIUM, NA, K, CO2, CL, BUN, CREATININE      _____________________________________________________  PHYSICAL EXAMINATION:  General: well developed and well nourished, alert, oriented times 3 and appears comfortable  Psychiatric: Normal  HEENT: Trachea Midline, No torticollis  Cardiovascular: No discernable arrhythmia  Pulmonary: No wheezing or stridor  Skin: No masses, erthema, lacerations, fluctation, ulcerations  Neurovascular:  Motor Intact to the Median, Ulnar, Radial Nerve and Pulses Intact    MUSCULOSKELETAL EXAMINATION:  RIGHT SIDE:  Cubital Tunnel:  No ulnar nerve subluxation, No atrophy, Negative clawing, Tinel's to the cubital tunnel, Positive Elbow Flexion/Compression Test and fdp to small and index 5/5, apb 5/5, intrinsic 5/5    _____________________________________________________  STUDIES REVIEWED:  EMG: mild but significant ulnar motor fiber slowing across cubital tunnel       PROCEDURES PERFORMED:  Procedures  No Procedures performed today   Scribe Attestation    I,:   Valerie Person am acting as a scribe while in the presence of the attending physician :        I,:   Sabrina Moser MD personally performed the services described in this documentation    as scribed in my presence :

## 2018-07-20 ENCOUNTER — EVALUATION (OUTPATIENT)
Dept: OCCUPATIONAL THERAPY | Facility: CLINIC | Age: 38
End: 2018-07-20
Payer: COMMERCIAL

## 2018-07-20 DIAGNOSIS — G56.21 CUBITAL TUNNEL SYNDROME ON RIGHT: ICD-10-CM

## 2018-07-20 PROCEDURE — G8991 OTHER PT/OT GOAL STATUS: HCPCS | Performed by: OCCUPATIONAL THERAPIST

## 2018-07-20 PROCEDURE — G8990 OTHER PT/OT CURRENT STATUS: HCPCS | Performed by: OCCUPATIONAL THERAPIST

## 2018-07-20 PROCEDURE — 97165 OT EVAL LOW COMPLEX 30 MIN: CPT | Performed by: OCCUPATIONAL THERAPIST

## 2018-07-20 PROCEDURE — L3702 EO W/O JOINTS CF: HCPCS | Performed by: OCCUPATIONAL THERAPIST

## 2018-07-20 PROCEDURE — 97140 MANUAL THERAPY 1/> REGIONS: CPT | Performed by: OCCUPATIONAL THERAPIST

## 2018-07-20 NOTE — PROGRESS NOTES
OT Evaluation     Today's date: 2018  Patient name: Blaise Sosa  : 1980  MRN: [id number]  Referring provider: Loyd Rich MD  Dx:   Encounter Diagnosis     ICD-10-CM    1  Cubital tunnel syndrome on right G56 21 Ambulatory referral to PT/OT hand therapy                  Assessment  Other impairment: Nighttime numbness  Patient presents with symptom irritability no  Assessment details: Referred for R cubital tunnel syndrome  His presentation and clinical findings are consistent with this referral including positive provocative testing  Numbness in the ulnar digits is most prevalent at night and with prolonged elbow flexed postures  Understanding of Dx/Px/POC: excellent  Goals  STG: Patient will be compliant with nighttime orthotic and home exercise program in 3 weeks  STG: Nighttime numbness will be reduced by two subjective levels in 2 weeks  LTG: Nighttime numbness will be reduced by 4 subjective levels in 6-8 weeks  LTG: FOTO score increase by 20 points within 8 weeks  Plan  Patient would benefit from: skilled OT, OT eval and custom splinting  Planned modality interventions: thermotherapy: hydrocollator packs  Planned therapy interventions: functional ROM exercises, home exercise program, manual therapy, patient education and massage  Other planned therapy interventions: KT, IASTM, RAMSEY  Frequency: 1x week  Duration in weeks: 6  Treatment plan discussed with: patient  Plan details: Treatment to include modalities, manual therapy, HEP as appropriate  Subjective Evaluation    History of Present Illness  Mechanism of injury: Referred for R cubital tunnel syndrome that has progressively gotten worse over the last 4 months  Recurrent probem    Pain  No pain reported    Social Support    Employment status: working ()    Diagnostic Tests  EMG: abnormal  Patient Goals  Patient goal: Decreased numbness           Objective     Neurological Testing     Sensation Wrist/Hand     Right   Intact: light touch and static two point discrimination    Strength/Myotome Testing     Left Wrist/Hand      (2nd hand position)     Trial 1: 100    Thumb Strength  Palmar/Three-Point Pinch     Trial 1: 13    Right Wrist/Hand      (2nd hand position)     Trial 1: 105    Thumb Strength   Palmar/Three-Point Pinch     Trial 1: 14    Tests     Right Shoulder   Positive ULTT2  Right Elbow   Positive elbow flexion and Tinel's sign (cubital tunnel)  Right Wrist/Hand   Negative Froment's sign       Additional Tests Details  - wartenberg's sign

## 2018-07-20 NOTE — PROGRESS NOTES
Daily Note     Today's date: 2018  Patient name: Lashaun Dozier  : 1980  MRN: [id number]  Referring provider: Isak Calderon MD  Dx:   Encounter Diagnosis     ICD-10-CM    1  Cubital tunnel syndrome on right G56 21 Ambulatory referral to PT/OT hand therapy     OT Plan of Care Cert/Re-cert       Start Time: 1100  Stop Time: 1200  Total time in clinic (min): 60 minutes    Subjective: See eval        Objective: See treatment diary below  Also fabricated nighttime orthotic per order  Assessment: Tolerated treatment well  Patient would benefit from continued OT  Plan: Progress treatment as tolerated  Focus on nerve gliding to reduce neural tension and irritation       Precautions [place]    Specialty Daily Treatment Diary     Manual         IASTM FCU 5'       RAMSEY 10'                                   Exercise Diary         HEP: RAMSEY Completed                                                                                                                                                                   Modalities        MHP 5'

## 2018-07-26 ENCOUNTER — OFFICE VISIT (OUTPATIENT)
Dept: OCCUPATIONAL THERAPY | Facility: CLINIC | Age: 38
End: 2018-07-26
Payer: COMMERCIAL

## 2018-07-26 DIAGNOSIS — G56.21 CUBITAL TUNNEL SYNDROME ON RIGHT: Primary | ICD-10-CM

## 2018-07-26 PROCEDURE — 97110 THERAPEUTIC EXERCISES: CPT | Performed by: OCCUPATIONAL THERAPIST

## 2018-07-26 PROCEDURE — 97140 MANUAL THERAPY 1/> REGIONS: CPT | Performed by: OCCUPATIONAL THERAPIST

## 2018-07-26 PROCEDURE — 97010 HOT OR COLD PACKS THERAPY: CPT | Performed by: OCCUPATIONAL THERAPIST

## 2018-07-26 NOTE — PROGRESS NOTES
Daily Note     Today's date: 2018  Patient name: Tila Lopez  : 1980  MRN: [id number]  Referring provider: Jim Nissen, MD  Dx:   Encounter Diagnosis     ICD-10-CM    1  Cubital tunnel syndrome on right G56 21                   Subjective: "It's going numb"      Objective: See treatment diary below  Assessment: Tolerated treatment well  Patient would benefit from continued OT  Tolerated strengthening well however N/T aparent by the end  Gave sleeper stretches for HEP, impaired anterior capsule flexibility      Plan: Progress treatment as tolerated  Focus on nerve gliding to reduce neural tension and irritation       Precautions [place]    Specialty Daily Treatment Diary     Manual        IASTM FCU 5' 5      RAMSEY 10' 10      Int rot stretch  2                          Exercise Diary         HEP: RAMSEY Completed       Digit ab/ad  Y gum      Gross grasp FDS focus  GPW 3x10      Gripper + pinch  Red SF and RF, 4th setting                                                                                                                                          Modalities       MHP 5' 10

## 2018-10-17 ENCOUNTER — OFFICE VISIT (OUTPATIENT)
Dept: OBGYN CLINIC | Facility: HOSPITAL | Age: 38
End: 2018-10-17
Payer: COMMERCIAL

## 2018-10-17 VITALS
SYSTOLIC BLOOD PRESSURE: 138 MMHG | DIASTOLIC BLOOD PRESSURE: 79 MMHG | HEIGHT: 70 IN | BODY MASS INDEX: 29.43 KG/M2 | HEART RATE: 77 BPM | WEIGHT: 205.6 LBS

## 2018-10-17 DIAGNOSIS — G56.21 CUBITAL TUNNEL SYNDROME ON RIGHT: Primary | ICD-10-CM

## 2018-10-17 PROCEDURE — 99213 OFFICE O/P EST LOW 20 MIN: CPT | Performed by: ORTHOPAEDIC SURGERY

## 2018-10-17 NOTE — PROGRESS NOTES
ASSESSMENT/PLAN:    Assessment:   Cubital Tunnel Syndrome  right    Plan:   Resume activities as tolerated  Ulnar nerve glides  Activity modification    Follow Up:  PRN    _____________________________________________________  CHIEF COMPLAINT:  Chief Complaint   Patient presents with    Right Elbow - Follow-up         SUBJECTIVE:  Joe Chen is a 43y o  year old male who presents for follow up regarding right cubital tunnel syndrome  Patient has been wearing his splint and was able to attend therapy and states his symptoms have greatly improved  At this time, only has some residual numbness at work when his arm is lifted for long periods of time  No weakness  No nocturnal symptoms  PAST MEDICAL HISTORY:  Past Medical History:   Diagnosis Date    Anxiety     last assessed: 10/27/2016    Epidermal inclusion cyst     last assessed: 10/7/2016    Hypercholesterolemia 08/20/2008    last assessed: 10/27/2016    Prehypertension     last assessed: 10/27/2016       PAST SURGICAL HISTORY:  History reviewed  No pertinent surgical history  FAMILY HISTORY:  Family History   Problem Relation Age of Onset    Hypertension Mother     Hypertension Father     Cancer Paternal Grandfather     Colon cancer Paternal Grandfather        SOCIAL HISTORY:  Social History   Substance Use Topics    Smoking status: Former Smoker    Smokeless tobacco: Former User    Alcohol use Yes      Comment: being a social drinker       MEDICATIONS:    Current Outpatient Prescriptions:     Methylprednisolone 4 MG TBPK, Use as directed on package (Patient not taking: Reported on 10/17/2018 ), Disp: 21 tablet, Rfl: 0    ALLERGIES:  Allergies   Allergen Reactions    Sesame Seed (Diagnostic) Itching and Swelling       REVIEW OF SYSTEMS:  Pertinent items are noted in HPI  A comprehensive review of systems was negative      LABS:  HgA1c: No results found for: HGBA1C  BMP: No results found for: GLUCOSE, CALCIUM, NA, K, CO2, CL, BUN, CREATININE        _____________________________________________________  PHYSICAL EXAMINATION:  General: well developed and well nourished, alert, oriented times 3 and appears comfortable  Psychiatric: Normal  HEENT: Trachea Midline, No torticollis  Cardiovascular: No discernable arrhythmia  Pulmonary: No wheezing or stridor  Skin: No masses, erthema, lacerations, fluctation, ulcerations  Neurovascular: Sensation Intact to the Median, Ulnar, Radial Nerve, Motor Intact to the Median, Ulnar, Radial Nerve and Pulses Intact    MUSCULOSKELETAL EXAMINATION:  RIGHT SIDE:  Cubital Tunnel:  No weakness ulnar nerve, Negative Tinel's, No ulnar nerve subluxation, No atrophy, Negative clawing, Negative elbow flexion compression test and Negative wartenberg's sign    _____________________________________________________  STUDIES REVIEWED:  No Studies to review      PROCEDURES PERFORMED:  Procedures  No Procedures performed today   Scribe Attestation    I,:   Lasha Bueno PA-C am acting as a scribe while in the presence of the attending physician :        I,:   Austen Friday, MD personally performed the services described in this documentation    as scribed in my presence :

## 2019-11-21 ENCOUNTER — APPOINTMENT (OUTPATIENT)
Dept: LAB | Age: 39
End: 2019-11-21
Attending: PREVENTIVE MEDICINE

## 2019-11-21 ENCOUNTER — TRANSCRIBE ORDERS (OUTPATIENT)
Dept: ADMINISTRATIVE | Age: 39
End: 2019-11-21

## 2019-11-21 DIAGNOSIS — Z00.00 PHYSICAL EXAM, ANNUAL: ICD-10-CM

## 2019-11-21 DIAGNOSIS — Z00.00 PHYSICAL EXAM, ANNUAL: Primary | ICD-10-CM

## 2019-11-21 LAB
ALBUMIN SERPL BCP-MCNC: 3.8 G/DL (ref 3.5–5)
ALP SERPL-CCNC: 55 U/L (ref 46–116)
ALT SERPL W P-5'-P-CCNC: 22 U/L (ref 12–78)
ANION GAP SERPL CALCULATED.3IONS-SCNC: 6 MMOL/L (ref 4–13)
AST SERPL W P-5'-P-CCNC: 9 U/L (ref 5–45)
BASOPHILS # BLD AUTO: 0.03 THOUSANDS/ΜL (ref 0–0.1)
BASOPHILS NFR BLD AUTO: 1 % (ref 0–1)
BILIRUB SERPL-MCNC: 0.91 MG/DL (ref 0.2–1)
BUN SERPL-MCNC: 12 MG/DL (ref 5–25)
C3 SERPL-MCNC: 139 MG/DL (ref 90–180)
CALCIUM SERPL-MCNC: 9.1 MG/DL (ref 8.3–10.1)
CHLORIDE SERPL-SCNC: 108 MMOL/L (ref 100–108)
CHOLEST SERPL-MCNC: 194 MG/DL (ref 50–200)
CO2 SERPL-SCNC: 26 MMOL/L (ref 21–32)
CREAT SERPL-MCNC: 0.99 MG/DL (ref 0.6–1.3)
EOSINOPHIL # BLD AUTO: 0.16 THOUSAND/ΜL (ref 0–0.61)
EOSINOPHIL NFR BLD AUTO: 3 % (ref 0–6)
ERYTHROCYTE [DISTWIDTH] IN BLOOD BY AUTOMATED COUNT: 12.3 % (ref 11.6–15.1)
GFR SERPL CREATININE-BSD FRML MDRD: 110 ML/MIN/1.73SQ M
GLUCOSE P FAST SERPL-MCNC: 103 MG/DL (ref 65–99)
HCT VFR BLD AUTO: 43.7 % (ref 36.5–49.3)
HDLC SERPL-MCNC: 49 MG/DL
HGB BLD-MCNC: 14.1 G/DL (ref 12–17)
IMM GRANULOCYTES # BLD AUTO: 0.01 THOUSAND/UL (ref 0–0.2)
IMM GRANULOCYTES NFR BLD AUTO: 0 % (ref 0–2)
LDLC SERPL CALC-MCNC: 120 MG/DL (ref 0–100)
LYMPHOCYTES # BLD AUTO: 2.7 THOUSANDS/ΜL (ref 0.6–4.47)
LYMPHOCYTES NFR BLD AUTO: 43 % (ref 14–44)
MCH RBC QN AUTO: 30.2 PG (ref 26.8–34.3)
MCHC RBC AUTO-ENTMCNC: 32.3 G/DL (ref 31.4–37.4)
MCV RBC AUTO: 94 FL (ref 82–98)
MONOCYTES # BLD AUTO: 0.86 THOUSAND/ΜL (ref 0.17–1.22)
MONOCYTES NFR BLD AUTO: 14 % (ref 4–12)
NEUTROPHILS # BLD AUTO: 2.4 THOUSANDS/ΜL (ref 1.85–7.62)
NEUTS SEG NFR BLD AUTO: 39 % (ref 43–75)
NONHDLC SERPL-MCNC: 145 MG/DL
NRBC BLD AUTO-RTO: 0 /100 WBCS
PLATELET # BLD AUTO: 227 THOUSANDS/UL (ref 149–390)
PMV BLD AUTO: 13.1 FL (ref 8.9–12.7)
POTASSIUM SERPL-SCNC: 3.4 MMOL/L (ref 3.5–5.3)
PROT SERPL-MCNC: 8 G/DL (ref 6.4–8.2)
RBC # BLD AUTO: 4.67 MILLION/UL (ref 3.88–5.62)
SODIUM SERPL-SCNC: 140 MMOL/L (ref 136–145)
TRIGL SERPL-MCNC: 125 MG/DL
WBC # BLD AUTO: 6.16 THOUSAND/UL (ref 4.31–10.16)

## 2019-11-21 PROCEDURE — 80061 LIPID PANEL: CPT

## 2019-11-21 PROCEDURE — 85025 COMPLETE CBC W/AUTO DIFF WBC: CPT

## 2019-11-21 PROCEDURE — 80053 COMPREHEN METABOLIC PANEL: CPT

## 2019-11-21 PROCEDURE — 36415 COLL VENOUS BLD VENIPUNCTURE: CPT

## 2019-11-21 PROCEDURE — 86160 COMPLEMENT ANTIGEN: CPT

## 2020-02-09 ENCOUNTER — HOSPITAL ENCOUNTER (EMERGENCY)
Facility: HOSPITAL | Age: 40
Discharge: HOME | End: 2020-02-09
Attending: EMERGENCY MEDICINE | Admitting: EMERGENCY MEDICINE
Payer: COMMERCIAL

## 2020-02-09 ENCOUNTER — APPOINTMENT (EMERGENCY)
Dept: ULTRASOUND IMAGING | Facility: HOSPITAL | Age: 40
End: 2020-02-09
Payer: COMMERCIAL

## 2020-02-09 VITALS
SYSTOLIC BLOOD PRESSURE: 147 MMHG | RESPIRATION RATE: 18 BRPM | HEART RATE: 107 BPM | DIASTOLIC BLOOD PRESSURE: 95 MMHG | TEMPERATURE: 98.6 F | WEIGHT: 209.44 LBS | OXYGEN SATURATION: 98 % | BODY MASS INDEX: 30.05 KG/M2

## 2020-02-09 DIAGNOSIS — M79.662 PAIN OF LEFT CALF: Primary | ICD-10-CM

## 2020-02-09 PROCEDURE — 99283 EMERGENCY DEPT VISIT LOW MDM: CPT | Performed by: EMERGENCY MEDICINE

## 2020-02-09 PROCEDURE — 93971 EXTREMITY STUDY: CPT

## 2020-02-09 PROCEDURE — 99283 EMERGENCY DEPT VISIT LOW MDM: CPT

## 2020-02-09 NOTE — ED PROVIDER NOTES
History  Chief Complaint   Patient presents with    Leg Pain     pt fx  his left ankle last sunday, now has posterior  left calf pain , denies SOB     35 y o  Male in a walking boot secondary to an ankle fracture last Sunday (1 week ago)  He presents today complaining of pain in his posterior calf on the same side (left)  No swelling, no chest pain and no shortness of breath  No history of dvt/pe  Not on blood thinners currently  History provided by:  Patient   used: No    Leg Pain   Location:  Leg  Time since incident:  1 week  Injury: yes    Leg location:  L lower leg  Pain details:     Quality:  Aching and throbbing    Radiates to:  Does not radiate    Severity:  Mild    Onset quality:  Gradual    Duration:  1 day    Timing:  Constant    Progression:  Unchanged  Chronicity:  New  Prior injury to area:  No  Relieved by:  Rest  Worsened by: Activity  Ineffective treatments:  None tried  Associated symptoms: no decreased ROM, no fever, no stiffness and no swelling        Prior to Admission Medications   Prescriptions Last Dose Informant Patient Reported? Taking? Methylprednisolone 4 MG TBPK Not Taking at Unknown time Self No No   Sig: Use as directed on package   Patient not taking: Reported on 10/17/2018       Facility-Administered Medications: None       Past Medical History:   Diagnosis Date    Anxiety     last assessed: 10/27/2016    Epidermal inclusion cyst     last assessed: 10/7/2016    Hypercholesterolemia 08/20/2008    last assessed: 10/27/2016    Prehypertension     last assessed: 10/27/2016       History reviewed  No pertinent surgical history  Family History   Problem Relation Age of Onset    Hypertension Mother     Hypertension Father     Cancer Paternal [place] Borer     Colon cancer Paternal Grandfather      I have reviewed and agree with the history as documented      Social History     Tobacco Use    Smoking status: Former Smoker    Smokeless tobacco: Former User   Substance Use Topics    Alcohol use: Yes     Comment: being a social drinker    Drug use: No        Review of Systems   Constitutional: Negative for chills and fever  Respiratory: Negative for shortness of breath  Cardiovascular: Negative for chest pain and leg swelling  Musculoskeletal: Positive for arthralgias (left calf pain)  Negative for stiffness  Skin: Negative for color change, rash and wound  Neurological: Negative for syncope  All other systems reviewed and are negative  Physical Exam  Physical Exam   Constitutional: He is oriented to person, place, and time  He appears well-developed and well-nourished  No distress  HENT:   Head: Normocephalic and atraumatic  Eyes: Pupils are equal, round, and reactive to light  EOM are normal    Neck: Normal range of motion  No JVD present  Cardiovascular: Normal rate, regular rhythm, normal heart sounds and intact distal pulses  Exam reveals no gallop and no friction rub  No murmur heard  Pulmonary/Chest: Effort normal and breath sounds normal  No respiratory distress  He has no wheezes  He has no rales  He exhibits no tenderness  Musculoskeletal: Normal range of motion  He exhibits tenderness (mild left calf pain)  He exhibits no edema or deformity  Neurological: He is alert and oriented to person, place, and time  Skin: Skin is warm and dry  Psychiatric: He has a normal mood and affect  His behavior is normal  Judgment and thought content normal    Nursing note and vitals reviewed        Vital Signs  ED Triage Vitals   Temperature Pulse Respirations Blood Pressure SpO2   02/09/20 1822 02/09/20 1818 02/09/20 1818 02/09/20 1818 02/09/20 1818   98 6 °F (37 °C) (!) 107 18 147/95 98 %      Temp Source Heart Rate Source Patient Position - Orthostatic VS BP Location FiO2 (%)   02/09/20 1822 -- -- -- --   Oral          Pain Score       02/09/20 1818       5           Vitals:    02/09/20 1818   BP: 147/95   Pulse: (!) 107 Visual Acuity      ED Medications  Medications - No data to display    Diagnostic Studies  Results Reviewed     None                 VAS lower limb venous duplex study, unilateral/limited    (Results Pending)              Procedures  Procedures         ED Course                               MDM  Number of Diagnoses or Management Options  Pain of left calf: new and requires workup  Diagnosis management comments: 35 y o  male presents with chief complaint of left  calf pain without swelling but with recent ankle fracture  Differential: dvt, superficial thrombophlebitis, baker's cyst, musculoskeletal calf pain         Amount and/or Complexity of Data Reviewed  Tests in the radiology section of CPT®: ordered and reviewed (Stew Pimentel reports negative ultrasound for DVT  )    Risk of Complications, Morbidity, and/or Mortality  Diagnostic procedures: high    Patient Progress  Patient progress: stable        Disposition  Final diagnoses:   Pain of left calf     Time reflects when diagnosis was documented in both MDM as applicable and the Disposition within this note     Time User Action Codes Description Comment    2/9/2020  6:40 PM Kimberly Serrato Add [V65 [id number]] Pain of left calf       ED Disposition     ED Disposition Condition Date/Time Comment    Discharge Stable Sun Feb 9, 2020  6:40 PM Kristel Armenta discharge to home/self care  Follow-up Information     Follow up With Specialties Details Why Contact Info    Mckinley aZman, [address] [facility], Nurse Practitioner Schedule an appointment as soon as possible for a visit in 1 week As needed [address]  [phone]            Discharge Medication List as of 2/9/2020  6:41 PM      CONTINUE these medications which have NOT CHANGED    Details   Methylprednisolone 4 MG TBPK Use as directed on package, Normal           No discharge procedures on file      ED Provider  Electronically Signed by           Moe Gamble MD  02/09/20 2038

## 2020-02-10 PROCEDURE — 93971 EXTREMITY STUDY: CPT | Performed by: SURGERY

## 2020-03-02 ENCOUNTER — OFFICE VISIT (OUTPATIENT)
Dept: FAMILY MEDICINE CLINIC | Facility: OTHER | Age: 40
End: 2020-03-02
Payer: COMMERCIAL

## 2020-03-02 VITALS
HEIGHT: 70 IN | HEART RATE: 106 BPM | SYSTOLIC BLOOD PRESSURE: 124 MMHG | WEIGHT: 201 LBS | DIASTOLIC BLOOD PRESSURE: 80 MMHG | BODY MASS INDEX: 28.77 KG/M2 | TEMPERATURE: 98.9 F | OXYGEN SATURATION: 98 %

## 2020-03-02 DIAGNOSIS — S92.902D CLOSED FRACTURE OF LEFT FOOT WITH ROUTINE HEALING, SUBSEQUENT ENCOUNTER: ICD-10-CM

## 2020-03-02 DIAGNOSIS — Z28.21 REFUSED INFLUENZA VACCINE: ICD-10-CM

## 2020-03-02 DIAGNOSIS — B35.6 TINEA CRURIS: ICD-10-CM

## 2020-03-02 DIAGNOSIS — E78.00 HYPERCHOLESTEROLEMIA: ICD-10-CM

## 2020-03-02 DIAGNOSIS — E66.3 OVERWEIGHT: ICD-10-CM

## 2020-03-02 DIAGNOSIS — Z00.00 WELL ADULT EXAM: Primary | ICD-10-CM

## 2020-03-02 DIAGNOSIS — R03.0 PREHYPERTENSION: ICD-10-CM

## 2020-03-02 DIAGNOSIS — Z11.4 SCREENING FOR HIV (HUMAN IMMUNODEFICIENCY VIRUS): ICD-10-CM

## 2020-03-02 PROBLEM — S92.902A FOOT FRACTURE, LEFT: Status: ACTIVE | Noted: 2020-03-02

## 2020-03-02 PROCEDURE — 99396 PREV VISIT EST AGE 40-64: CPT | Performed by: NURSE PRACTITIONER

## 2020-03-02 PROCEDURE — 3008F BODY MASS INDEX DOCD: CPT | Performed by: NURSE PRACTITIONER

## 2020-03-02 PROCEDURE — 3074F SYST BP LT 130 MM HG: CPT | Performed by: NURSE PRACTITIONER

## 2020-03-02 PROCEDURE — 3079F DIAST BP 80-89 MM HG: CPT | Performed by: NURSE PRACTITIONER

## 2020-03-02 RX ORDER — KETOCONAZOLE 20 MG/G
CREAM TOPICAL DAILY
Qty: 60 G | Refills: 1 | Status: SHIPPED | OUTPATIENT
Start: 2020-03-02 | End: 2022-01-13

## 2020-03-02 NOTE — PROGRESS NOTES
Assessment/Plan:         Problem List Items Addressed This Visit     Foot fracture, left  --Slowly improving  Hopes to be back to work in the next few weeks  No analgesics at present  --Followed by ortho ([facility])  Prehypertension    Overweight  --Normotensive in office today  Continued dietary, weight loss measures encouraged  Handouts given  --Normal labs 11/2019  --Periodic home BP monitoring advised, calling for readings consistently > 130/80      Other Visit Diagnoses     Well adult exam    -  Primary  --Establishing regular cardio exercise program advised when foot is better  --UTD with immunizations  --Routine eye exam advised  Screening for HIV (human immunodeficiency virus)        Relevant Orders    [facility] HIV 1/2 AG-AB combo    Refused influenza vaccine        Relevant Orders    influenza vaccine, [id number], quadrivalent, 0 5 mL, preservative-free, for adult and pediatric patients 6 mos+ (AFLURIA, FLUARIX, FLULAVAL, FLUZONE)      Tinea cruris      --Keep area dry, well ventilated if possible    Relevant Medications    ketoconazole (NIZORAL) 2 % cream BID          RTO 1 year      Subjective:      Patient ID: Jeffrey Monreal is a 35 y o  male  Here for routine well exam      Fractured his left foot in early February after missing step  Followed by [facility]  Wearing boot  Pain improved  Getting around without crutches  Out on STD  Hopes to be back to work by the end of the month  Currently works for [employer] in [place]  [place]  Jock itch both inguinal folds x couple weeks  No OTC products used  Has had the issue in the past, mainly during warmer months  No complaints otherwise  No recent home BP readings  Normal lipids, other labs 11/2019  Denies depression, sleep apnea  Diet remains fair  Some fruits and vegetables  Admits to liking salty foods, potato chips in particular  Lifts weights but no regular cardio activity       Lives with steady girlfriend  No plans for marriage at this time  Quit smoking 16 years ago  ETOH: 2-4 beers per week  No liquor  No drug use  Tdap 8/2016  Wears glasses  Rx UTD  Eye exam two years ago  The following portions of the patient's history were reviewed and updated as appropriate: current medications, past family history, past medical history, past social history, past surgical history and problem list     Review of Systems   Constitutional: Negative for fatigue and fever  HENT: Negative for sore throat  Eyes: Negative for visual disturbance  Respiratory: Negative for cough and shortness of breath  Cardiovascular: Negative for chest pain and palpitations  Gastrointestinal: Negative for abdominal pain, constipation, diarrhea and vomiting  Genitourinary: Negative for difficulty urinating and dysuria  Musculoskeletal:        Per HPI   Skin:        Per HPI   Neurological: Negative for dizziness and headaches  Psychiatric/Behavioral: Negative for dysphoric mood  Objective:      /80 (BP Location: Left arm, Patient Position: Sitting, Cuff Size: Adult)   Pulse (!) 106   Temp 98 9 °F (37 2 °C) (Tympanic)   Ht 5' 10" (1 778 m)   Wt 91 2 kg (201 lb)   SpO2 98%   BMI 28 84 kg/m²          Physical Exam   Constitutional: He is oriented to person, place, and time  He appears well-developed and well-nourished  HENT:   Head: Normocephalic and atraumatic  Right Ear: External ear normal    Left Ear: External ear normal    Nose: Nose normal    Mouth/Throat: Oropharynx is clear and moist    Eyes: Pupils are equal, round, and reactive to light  Conjunctivae are normal    Neck: Normal range of motion  Neck supple  Cardiovascular: Normal rate, regular rhythm, normal heart sounds and intact distal pulses  Pulmonary/Chest: Effort normal and breath sounds normal    Abdominal: Soft  Bowel sounds are normal  There is no tenderness   Hernia confirmed negative in the right inguinal area and confirmed negative in the left inguinal area  Genitourinary: Testes normal and penis normal    Genitourinary Comments: Crural folds with mild diffuse hypopigmentation and scaling   Lymphadenopathy:     He has no cervical adenopathy  Neurological: He is alert and oriented to person, place, and time  He has normal reflexes  Skin: Skin is warm and dry  Psychiatric: He has a normal mood and affect

## 2020-03-02 NOTE — PROGRESS NOTES
PHQ-9 Depression Screening    PHQ-9:    Frequency of the following problems over the past two weeks:       Little interest or pleasure in doing things:  0 - not at all  Feeling down, depressed, or hopeless:  0 - not at all  PHQ-2 Score:  0     BMI Counseling: Body mass index is 28 84 kg/m²  The BMI is above normal  Nutrition recommendations include reducing portion sizes, decreasing overall calorie intake, 3-5 servings of fruits/vegetables daily, reducing fast food intake, consuming healthier snacks, decreasing soda and/or juice intake and moderation in carbohydrate intake  Exercise recommendations include exercising 3-5 times per week

## 2020-03-02 NOTE — PATIENT INSTRUCTIONS
Wellness Visit for Adults   WHAT YOU NEED TO KNOW:   What is a wellness visit? A wellness visit is when you see your healthcare provider to get screened for health problems  You can also get advice on how to stay healthy  Write down your questions so you remember to ask them  Ask your healthcare provider how often you should have a wellness visit  What happens at a wellness visit? Your healthcare provider will ask about your health, and your family history of health problems  This includes high blood pressure, heart disease, and cancer  He or she will ask if you have symptoms that concern you, if you smoke, and about your mood  You may also be asked about your intake of medicines, supplements, food, and alcohol  Any of the following may be done:  · Your weight  will be checked  Your height may also be checked so your body mass index (BMI) can be calculated  Your BMI shows if you are at a healthy weight  · Your blood pressure  and heart rate will be checked  Your temperature may also be checked  · Blood and urine tests  may be done  Blood tests may be done to check your cholesterol levels  Abnormal cholesterol levels increase your risk for heart disease and stroke  You may also need a blood or urine test to check for diabetes if you are at increased risk  Urine tests may be done to look for signs of an infection or kidney disease  · A physical exam  includes checking your heartbeat and lungs with a stethoscope  Your healthcare provider may also check your skin to look for sun damage  · Screening tests  may be recommended  A screening test is done to check for diseases that may not cause symptoms  The screening tests you may need depend on your age, gender, family history, and lifestyle habits  For example, colorectal screening may be recommended if you are 48years old or older  What screening tests do I need if I am a woman? · A Pap smear  is used to screen for cervical cancer   Pap smears are usually done every 3 to 5 years depending on your age  You may need them more often if you have had abnormal Pap smear test results in the past  Ask your healthcare provider how often you should have a Pap smear  · A mammogram  is an x-ray of your breasts to screen for breast cancer  Experts recommend mammograms every 2 years starting at age 48 years  You may need a mammogram at age 52 years or younger if you have an increased risk for breast cancer  Talk to your healthcare provider about when you should start having mammograms and how often you need them  What vaccines might I need? · Get an influenza vaccine  every year  The influenza vaccine protects you from the flu  Several types of viruses cause the flu  The viruses change over time, so new vaccines are made each year  · Get a tetanus-diphtheria (Td) booster vaccine  every 10 years  This vaccine protects you against tetanus and diphtheria  Tetanus is a severe infection that may cause painful muscle spasms and lockjaw  Diphtheria is a severe bacterial infection that causes a thick covering in the back of your mouth and throat  · Get a human papillomavirus (HPV) vaccine  if you are female and aged 23 to 32 or male 23 to 24 and never received it  This vaccine protects you from HPV infection  HPV is the most common infection spread by sexual contact  HPV may also cause vaginal, penile, and anal cancers  · Get a pneumococcal vaccine  if you are aged 72 years or older  The pneumococcal vaccine is an injection given to protect you from pneumococcal disease  Pneumococcal disease is an infection caused by pneumococcal bacteria  The infection may cause pneumonia, meningitis, or an ear infection  · Get a shingles vaccine  if you are aged 61 or older, even if you have had shingles before  The shingles vaccine is an injection to protect you from the varicella-zoster virus  This is the same virus that causes chickenpox   Shingles is a painful rash that develops in people who had chickenpox or have been exposed to the virus  How can I eat healthy? My Plate is a model for planning healthy meals  It shows the types and amounts of foods that should go on your plate  Fruits and vegetables make up about half of your plate, and grains and protein make up the other half  A serving of dairy is included on the side of your plate  The amount of calories and serving sizes you need depends on your age, gender, weight, and height  Examples of healthy foods are listed below:  · Eat a variety of vegetables  such as dark green, red, and orange vegetables  You can also include canned vegetables low in sodium (salt) and frozen vegetables without added butter or sauces  · Eat a variety of fresh fruits , canned fruit in 100% juice, frozen fruit, and dried fruit  · Include whole grains  At least half of the grains you eat should be whole grains  Examples include whole-wheat bread, wheat pasta, brown rice, and whole-grain cereals such as oatmeal     · Eat a variety of protein foods such as seafood (fish and shellfish), lean meat, and poultry without skin (turkey and chicken)  Examples of lean meats include pork leg, shoulder, or tenderloin, and beef round, sirloin, tenderloin, and extra lean ground beef  Other protein foods include eggs and egg substitutes, beans, peas, soy products, nuts, and seeds  · Choose low-fat dairy products such as skim or 1% milk or low-fat yogurt, cheese, and cottage cheese  · Limit unhealthy fats  such as butter, hard margarine, and shortening  How much exercise do I need? Exercise at least 30 minutes per day on most days of the week  Some examples of exercise include walking, biking, dancing, and swimming  You can also fit in more physical activity by taking the stairs instead of the elevator or parking farther away from stores  Include muscle strengthening activities 2 days each week  Regular exercise provides many health benefits  It helps you manage your weight, and decreases your risk for type 2 diabetes, heart disease, stroke, and high blood pressure  Exercise can also help improve your mood  Ask your healthcare provider about the best exercise plan for you  What are some general health and safety guidelines I should follow? · Do not smoke  Nicotine and other chemicals in cigarettes and cigars can cause lung damage  Ask your healthcare provider for information if you currently smoke and need help to quit  E-cigarettes or smokeless tobacco still contain nicotine  Talk to your healthcare provider before you use these products  · Limit alcohol  A drink of alcohol is 12 ounces of beer, 5 ounces of wine, or 1½ ounces of liquor  · Lose weight, if needed  Being overweight increases your risk of certain health conditions  These include heart disease, high blood pressure, type 2 diabetes, and certain types of cancer  · Protect your skin  Do not sunbathe or use tanning beds  Use sunscreen with a SPF 15 or higher  Apply sunscreen at least 15 minutes before you go outside  Reapply sunscreen every 2 hours  Wear protective clothing, hats, and sunglasses when you are outside  · Drive safely  Always wear your seatbelt  Make sure everyone in your car wears a seatbelt  A seatbelt can save your life if you are in an accident  Do not use your cell phone when you are driving  This could distract you and cause an accident  Pull over if you need to make a call or send a text message  · Practice safe sex  Use latex condoms if are sexually active and have more than one partner  Your healthcare provider may recommend screening tests for sexually transmitted infections (STIs)  · Wear helmets, lifejackets, and protective gear  Always wear a helmet when you ride a bike or motorcycle, go skiing, or play sports that could cause a head injury  Wear protective equipment when you play sports   Wear a lifejacket when you are on a boat or doing water sports  CARE AGREEMENT:   You have the right to help plan your care  Learn about your health condition and how it may be treated  Discuss treatment options with your caregivers to decide what care you want to receive  You always have the right to refuse treatment  The above information is an  only  It is not intended as medical advice for individual conditions or treatments  Talk to your doctor, nurse or pharmacist before following any medical regimen to see if it is safe and effective for you  © 2017 [employer] Information is for End User's use only and may not be sold, redistributed or otherwise used for commercial purposes  All illustrations and images included in CareNotes® are the copyrighted property of A [employer] M [employer]  or [employer]

## 2020-04-23 ENCOUNTER — TELEMEDICINE (OUTPATIENT)
Dept: FAMILY MEDICINE CLINIC | Facility: OTHER | Age: 40
End: 2020-04-23
Payer: COMMERCIAL

## 2020-04-23 DIAGNOSIS — R07.81 RIB PAIN ON LEFT SIDE: Primary | ICD-10-CM

## 2020-04-23 PROCEDURE — 99213 OFFICE O/P EST LOW 20 MIN: CPT | Performed by: NURSE PRACTITIONER

## 2020-04-23 RX ORDER — LIDOCAINE 50 MG/G
1 PATCH TOPICAL DAILY
Qty: 10 PATCH | Refills: 2 | Status: SHIPPED | OUTPATIENT
Start: 2020-04-23 | End: 2022-01-13

## 2021-12-09 ENCOUNTER — TELEPHONE (OUTPATIENT)
Dept: FAMILY MEDICINE CLINIC | Facility: CLINIC | Age: 41
End: 2021-12-09

## 2022-01-13 ENCOUNTER — OFFICE VISIT (OUTPATIENT)
Dept: FAMILY MEDICINE CLINIC | Facility: CLINIC | Age: 42
End: 2022-01-13
Payer: COMMERCIAL

## 2022-01-13 VITALS
DIASTOLIC BLOOD PRESSURE: 76 MMHG | SYSTOLIC BLOOD PRESSURE: 120 MMHG | TEMPERATURE: 97.4 F | HEART RATE: 88 BPM | OXYGEN SATURATION: 98 % | BODY MASS INDEX: 27.49 KG/M2 | RESPIRATION RATE: 16 BRPM | WEIGHT: 192 LBS | HEIGHT: 70 IN

## 2022-01-13 DIAGNOSIS — Z13.6 SCREENING FOR CARDIOVASCULAR CONDITION: ICD-10-CM

## 2022-01-13 DIAGNOSIS — Z13.1 SCREENING FOR DIABETES MELLITUS: ICD-10-CM

## 2022-01-13 DIAGNOSIS — Z00.00 ENCOUNTER FOR ANNUAL PHYSICAL EXAM: Primary | ICD-10-CM

## 2022-01-13 PROBLEM — I10 MILD HYPERTENSION: Status: RESOLVED | Noted: 2018-03-22 | Resolved: 2022-01-13

## 2022-01-13 PROBLEM — R07.81 RIB PAIN ON LEFT SIDE: Status: RESOLVED | Noted: 2020-04-23 | Resolved: 2022-01-13

## 2022-01-13 PROBLEM — S92.902A FOOT FRACTURE, LEFT: Status: RESOLVED | Noted: 2020-03-02 | Resolved: 2022-01-13

## 2022-01-13 PROCEDURE — 1036F TOBACCO NON-USER: CPT | Performed by: FAMILY MEDICINE

## 2022-01-13 PROCEDURE — 3008F BODY MASS INDEX DOCD: CPT | Performed by: FAMILY MEDICINE

## 2022-01-13 PROCEDURE — 3725F SCREEN DEPRESSION PERFORMED: CPT | Performed by: FAMILY MEDICINE

## 2022-01-13 PROCEDURE — 99386 PREV VISIT NEW AGE 40-64: CPT | Performed by: FAMILY MEDICINE

## 2022-01-13 NOTE — ASSESSMENT & PLAN NOTE
CPE done  Last Tdap was in 2016  Pt declined flu shot and COVID vaccines  Will check FBS and lipids  Pt advised to follow a well balanced, heart healthy diet and to exercise on a regular basis  Not a smoker  BP good

## 2022-01-13 NOTE — PATIENT INSTRUCTIONS

## 2022-01-13 NOTE — PROGRESS NOTES
BMI Counseling: Body mass index is 27 55 kg/m²  The BMI is above normal  Nutrition recommendations include decreasing portion sizes, encouraging healthy choices of fruits and vegetables, consuming healthier snacks and moderation in carbohydrate intake  Exercise recommendations include exercising 3-5 times per week  No pharmacotherapy was ordered  Rationale for BMI follow-up plan is due to patient being overweight or obese  Depression Screening and Follow-up Plan: Patient was screened for depression during today's encounter  They screened negative with a PHQ-2 score of 0  Assessment/Plan:         Problem List Items Addressed This Visit        Other    Encounter for annual physical exam - Primary     CPE done  Last Tdap was in 2016  Pt declined flu shot and COVID vaccines  Will check FBS and lipids  Pt advised to follow a well balanced, heart healthy diet and to exercise on a regular basis  Not a smoker  BP good  Other Visit Diagnoses     Screening for cardiovascular condition        Relevant Orders    Lipid panel    Screening for diabetes mellitus        Relevant Orders    Glucose, fasting            Subjective:      Patient ID: Janice Jean is a 42 y o  male  Pt here for physical  Doing well  No cp/sob  No headaches  No abd pain  BP was borderline in the past and pt follows low Na diet  No regular exercise  Not a smoker  Occasional ETOH  Last Tdap was in 2016  Doesn't want flu shot or COVID vaccines  No c/o today  The following portions of the patient's history were reviewed and updated as appropriate:   Past Medical History:  He has a past medical history of Anxiety, Epidermal inclusion cyst, Hypercholesterolemia (08/20/2008), and Prehypertension  ,  _______________________________________________________________________  Medical Problems:  does not have any pertinent problems on file ,  _______________________________________________________________________  Past Surgical History:   has no past surgical history on file ,  _______________________________________________________________________  Family History:  family history includes Cancer in his paternal grandfather; Colon cancer in his paternal grandfather; Hypertension in his father and mother ,  _______________________________________________________________________  Social History:   reports that he quit smoking about 15 years ago  His smoking use included cigarettes  He smoked 0 50 packs per day  He has never used smokeless tobacco  He reports current alcohol use  He reports that he does not use drugs  ,  _______________________________________________________________________  Allergies:  is allergic to sesame seed (diagnostic) - food allergy     _______________________________________________________________________  No current outpatient medications on file  No current facility-administered medications for this visit      _______________________________________________________________________  Review of Systems   Constitutional: Negative for activity change, appetite change, fatigue and unexpected weight change  HENT: Negative for congestion, ear pain, rhinorrhea, sore throat and trouble swallowing  Eyes: Negative for pain, discharge and visual disturbance  Respiratory: Negative for cough, shortness of breath and wheezing  Cardiovascular: Negative for chest pain, palpitations and leg swelling  Gastrointestinal: Negative for abdominal pain, constipation, diarrhea, nausea and vomiting  Endocrine: Negative for polydipsia, polyphagia and polyuria  Genitourinary: Negative for difficulty urinating and hematuria  Musculoskeletal: Negative for arthralgias, back pain, gait problem, myalgias and neck pain  Skin: Negative for color change and rash  Neurological: Negative for dizziness, weakness and headaches  Hematological: Negative for adenopathy  Does not bruise/bleed easily     Psychiatric/Behavioral: Negative for dysphoric mood and sleep disturbance  The patient is not nervous/anxious  Objective:  Vitals:    01/13/22 0954   BP: 120/76   BP Location: Left arm   Patient Position: Sitting   Cuff Size: Large   Pulse: 88   Resp: 16   Temp: (!) 97 4 °F (36 3 °C)   TempSrc: Temporal   SpO2: 98%   Weight: 87 1 kg (192 lb)   Height: 5' 10" (1 778 m)     Body mass index is 27 55 kg/m²  Physical Exam  Vitals and nursing note reviewed  Constitutional:       Appearance: Normal appearance  He is well-developed  HENT:      Head: Normocephalic and atraumatic  Right Ear: Tympanic membrane, ear canal and external ear normal       Left Ear: Tympanic membrane, ear canal and external ear normal       Nose: Nose normal       Mouth/Throat:      Mouth: Mucous membranes are moist       Pharynx: Oropharynx is clear  No posterior oropharyngeal erythema  Eyes:      Conjunctiva/sclera: Conjunctivae normal       Pupils: Pupils are equal, round, and reactive to light  Neck:      Thyroid: No thyromegaly  Cardiovascular:      Rate and Rhythm: Normal rate and regular rhythm  Heart sounds: Normal heart sounds  No murmur heard  Pulmonary:      Effort: Pulmonary effort is normal       Breath sounds: Normal breath sounds  No wheezing or rales  Abdominal:      General: Bowel sounds are normal  There is no distension  Palpations: Abdomen is soft  There is no mass  Tenderness: There is no abdominal tenderness  Musculoskeletal:         General: No deformity  Normal range of motion  Cervical back: Normal range of motion and neck supple  Right lower leg: No edema  Left lower leg: No edema  Lymphadenopathy:      Cervical: No cervical adenopathy  Skin:     General: Skin is warm and dry  Capillary Refill: Capillary refill takes less than 2 seconds  Findings: No erythema or rash  Neurological:      General: No focal deficit present        Mental Status: He is alert and oriented to person, place, and time  Mental status is at baseline  Cranial Nerves: No cranial nerve deficit  Sensory: No sensory deficit  Motor: No abnormal muscle tone  Coordination: Coordination normal    Psychiatric:         Mood and Affect: Mood normal          Behavior: Behavior normal          Thought Content:  Thought content normal          Judgment: Judgment normal

## 2022-08-05 ENCOUNTER — OFFICE VISIT (OUTPATIENT)
Dept: FAMILY MEDICINE CLINIC | Facility: CLINIC | Age: 42
End: 2022-08-05
Payer: COMMERCIAL

## 2022-08-05 VITALS
BODY MASS INDEX: 26.8 KG/M2 | OXYGEN SATURATION: 98 % | HEART RATE: 80 BPM | DIASTOLIC BLOOD PRESSURE: 90 MMHG | RESPIRATION RATE: 16 BRPM | HEIGHT: 70 IN | SYSTOLIC BLOOD PRESSURE: 130 MMHG | WEIGHT: 187.2 LBS | TEMPERATURE: 97.2 F

## 2022-08-05 DIAGNOSIS — Z09 ENCOUNTER FOR FOLLOW-UP: Primary | ICD-10-CM

## 2022-08-05 PROCEDURE — 99213 OFFICE O/P EST LOW 20 MIN: CPT

## 2022-08-05 NOTE — PROGRESS NOTES
Assessment/Plan:    Prehypertension  BP stable recommend heart healthy diet and exercise 150 min/week    Cubital tunnel syndrome on right  Elbow brace as needed  Diagnoses and all orders for this visit:    Encounter for follow-up          Subjective:      Patient ID: Terrence Riley is a 36 y o  male  [facility] and had f/u questions about his CBC  Monocytes were slightly elevated educate could possibly be related to inflammation  He states that he has cubital tunnel syndrome on his R hand that flares up when he works for extended periods  He also sometimes has rib pain after sitting in poor posture for working and resolves after a few hours  Encouraged stretching exercises and elbow brace  He noticed the palpitations and fatigue were present after eating certain foods high in carbohydrate and fat  He had TSH and CMP done which were all normal  Cardiac work-up was negative, he has eliminated certain foods from his diet and noticed improvement  Recommend healthy eating habits and regular exercise and follow-up if symptoms re-occur  The following portions of the patient's history were reviewed and updated as appropriate: allergies, current medications, past family history, past medical history, past social history, past surgical history and problem list     Review of Systems   Constitutional: Positive for fatigue  Negative for activity change, appetite change, chills, diaphoresis and fever  HENT: Negative for congestion  Eyes: Negative for discharge  Respiratory: Negative for cough, chest tightness and shortness of breath  Cardiovascular: Positive for palpitations  Negative for chest pain and leg swelling  Gastrointestinal: Negative for abdominal pain, blood in stool, constipation, diarrhea, nausea and vomiting  Genitourinary: Negative for difficulty urinating     Musculoskeletal: Negative for arthralgias, back pain, joint swelling, myalgias, neck pain and neck stiffness  Allergic/Immunologic: Negative for environmental allergies and food allergies  Neurological: Negative for dizziness, weakness, light-headedness and headaches  Objective:      /90 (BP Location: Right arm, Patient Position: Sitting, Cuff Size: Adult)   Pulse 80   Temp (!) 97 2 °F (36 2 °C) (Temporal)   Resp 16   Ht 5' 10" (1 778 m)   Wt 84 9 kg (187 lb 3 2 oz)   SpO2 98%   BMI 26 86 kg/m²          Physical Exam  Vitals and nursing note reviewed  Constitutional:       General: He is not in acute distress  Appearance: Normal appearance  He is not ill-appearing or toxic-appearing  HENT:      Head: Normocephalic  Nose: Nose normal       Mouth/Throat:      Mouth: Mucous membranes are moist    Eyes:      Conjunctiva/sclera: Conjunctivae normal       Pupils: Pupils are equal, round, and reactive to light  Cardiovascular:      Rate and Rhythm: Normal rate and regular rhythm  Pulses: Normal pulses  Heart sounds: Normal heart sounds  Pulmonary:      Effort: Pulmonary effort is normal       Breath sounds: Normal breath sounds  Abdominal:      General: Bowel sounds are normal       Palpations: Abdomen is soft  Musculoskeletal:      Cervical back: Normal range of motion  Skin:     General: Skin is warm  Neurological:      General: No focal deficit present  Mental Status: He is alert and oriented to person, place, and time  Psychiatric:         Mood and Affect: Mood normal          Behavior: Behavior normal          Thought Content:  Thought content normal

## 2023-09-07 ENCOUNTER — OFFICE VISIT (OUTPATIENT)
Dept: FAMILY MEDICINE CLINIC | Facility: CLINIC | Age: 43
End: 2023-09-07
Payer: COMMERCIAL

## 2023-09-07 VITALS
BODY MASS INDEX: 28.49 KG/M2 | RESPIRATION RATE: 20 BRPM | OXYGEN SATURATION: 97 % | SYSTOLIC BLOOD PRESSURE: 130 MMHG | HEIGHT: 70 IN | WEIGHT: 199 LBS | DIASTOLIC BLOOD PRESSURE: 82 MMHG | TEMPERATURE: 98.3 F | HEART RATE: 84 BPM

## 2023-09-07 DIAGNOSIS — Z00.8 EXAM FOR POPULATION SURVEY: ICD-10-CM

## 2023-09-07 DIAGNOSIS — G89.29 CHRONIC BILATERAL LOW BACK PAIN WITHOUT SCIATICA: ICD-10-CM

## 2023-09-07 DIAGNOSIS — Z13.6 SCREENING FOR CARDIOVASCULAR CONDITION: ICD-10-CM

## 2023-09-07 DIAGNOSIS — Z00.00 ENCOUNTER FOR ANNUAL PHYSICAL EXAM: Primary | ICD-10-CM

## 2023-09-07 DIAGNOSIS — Z13.1 SCREENING FOR DIABETES MELLITUS: ICD-10-CM

## 2023-09-07 DIAGNOSIS — M54.50 CHRONIC BILATERAL LOW BACK PAIN WITHOUT SCIATICA: ICD-10-CM

## 2023-09-07 PROBLEM — E66.3 OVERWEIGHT: Status: RESOLVED | Noted: 2018-03-22 | Resolved: 2023-09-07

## 2023-09-07 PROCEDURE — 99396 PREV VISIT EST AGE 40-64: CPT | Performed by: FAMILY MEDICINE

## 2023-09-07 NOTE — PATIENT INSTRUCTIONS
Lower Back Exercises   WHAT YOU NEED TO KNOW:   Lower back exercises help heal and strengthen your back muscles to prevent another injury. Ask your healthcare provider if you need to see a physical therapist for more advanced exercises. DISCHARGE INSTRUCTIONS:   Return to the emergency department if:   You have severe pain that prevents you from moving. Call your doctor if:   Your pain becomes worse. You have new pain. You have questions or concerns about your condition or care. Do lower back exercises safely:   Do the exercises on a mat or firm surface (not on a bed). A firm surface will support your spine and prevent low back pain. Move slowly and smoothly. Avoid fast or jerky motions. Breathe normally. Do not hold your breath. Stop if you feel pain. It is normal to feel some discomfort at first, but you should not feel pain. Regular exercise will help decrease your discomfort over time. Lower back exercises: Your healthcare provider may recommend that you do back exercises 10 to 30 minutes each day. He or she may also recommend that you do exercises 1 to 3 times each day. Ask your provider which exercises are best for you and how often to do them. Ankle pumps:  Lie on your back. Move your foot up (with your toes pointing toward your head). Then, move your foot down (with your toes pointing away from you). Repeat this exercise 10 times on each side. Heel slides:  Lie on your back. Slowly bend one leg and then straighten it. Next, bend the other leg and then straighten it. Repeat 10 times on each side. Pelvic tilt:  Lie on your back with your knees bent and feet flat on the floor. Place your arms in a relaxed position beside your body. Tighten the muscles of your abdomen and flatten your back against the floor. Hold for 5 seconds. Repeat 5 times. Back stretch:  Lie on your back with your hands behind your head.  Bend your knees and turn the lower half of your body to one side. Hold this position for 10 seconds. Repeat 3 times on each side. Straight leg raises:  Lie on your back with one leg straight. Bend the other knee. Tighten your abdomen and then slowly lift the straight leg up about 6 to 12 inches off the floor. Hold for 1 to 5 seconds. Lower your leg slowly. Repeat 10 times on each leg. Knee-to-chest:  Lie on your back with your knees bent and feet flat on the floor. Pull one of your knees toward your chest and hold it there for 5 seconds. Return your leg to the starting position. Lift the other knee toward your chest and hold for 5 seconds. Do this 5 times on each side. Cat and camel:  Place your hands and knees on the floor. Arch your back upward toward the ceiling and lower your head. Round out your spine as much as you can. Hold for 5 seconds. Lift your head upward and push your chest downward toward the floor. Hold for 5 seconds. Do 3 sets or as directed. Wall squats:  Stand with your back against a wall. Tighten the muscles of your abdomen. Slowly lower your body until your knees are bent at a 45 degree angle. Hold this position for 5 seconds. Slowly move back up to a standing position. Repeat 10 times. Curl up:  Lie on your back with your knees bent and feet flat on the floor. Place your hands, palms down, underneath the curve in your lower back. Next, with your elbows on the floor, lift your shoulders and chest 2 to 3 inches. Keep your head in line with your shoulders. Hold this position for 5 seconds. When you can do this exercise without pain for 10 to 15 seconds, you may add a rotation. While your shoulders and chest are lifted off the ground, turn slightly to the left and hold. Repeat on the other side. Bird dog:  Place your hands and knees on the floor. Keep your wrists directly below your shoulders and your knees directly below your hips. Pull your belly button in toward your spine.  Do not flatten or arch your back. Tighten your abdominal muscles. Raise one arm straight out so that it is aligned with your head. Next, raise the leg opposite your arm. Hold this position for 15 seconds. Lower your arm and leg slowly and change sides. Do 5 sets. Follow up with your doctor as directed:  Write down your questions so you remember to ask them during your visits. © Copyright Rainer Abdi 2022 Information is for End User's use only and may not be sold, redistributed or otherwise used for commercial purposes. The above information is an  only. It is not intended as medical advice for individual conditions or treatments. Talk to your doctor, nurse or pharmacist before following any medical regimen to see if it is safe and effective for you. Wellness Visit for Adults   AMBULATORY CARE:   A wellness visit  is when you see your healthcare provider to get screened for health problems. Your healthcare provider will also give you advice on how to stay healthy. Write down your questions so you remember to ask them. Ask your healthcare provider how often you should have a wellness visit. What happens at a wellness visit:  Your healthcare provider will ask about your health, and your family history of health problems. This includes high blood pressure, heart disease, and cancer. He or she will ask if you have symptoms that concern you, if you smoke, and about your mood. You may also be asked about your intake of medicines, supplements, food, and alcohol. Any of the following may be done: Your weight  will be checked. Your height may also be checked so your body mass index (BMI) can be calculated. Your BMI shows if you are at a healthy weight. Your blood pressure  and heart rate will be checked. Your temperature may also be checked. Blood and urine tests  may be done. Blood tests may be done to check your cholesterol levels. Abnormal cholesterol levels increase your risk for heart disease and stroke. You may also need a blood or urine test to check for diabetes if you are at increased risk. Urine tests may be done to look for signs of an infection or kidney disease. A physical exam  includes checking your heartbeat and lungs with a stethoscope. Your healthcare provider may also check your skin to look for sun damage. Screening tests  may be recommended. A screening test is done to check for diseases that may not cause symptoms. The screening tests you may need depend on your age, gender, family history, and lifestyle habits. For example, colorectal screening may be recommended if you are 48years old or older. Screening tests you need if you are a woman:   A Pap smear  is used to screen for cervical cancer. Pap smears are usually done every 3 to 5 years depending on your age. You may need them more often if you have had abnormal Pap smear test results in the past. Ask your healthcare provider how often you should have a Pap smear. A mammogram  is an x-ray of your breasts to screen for breast cancer. Experts recommend mammograms every 2 years starting at age 48 years. You may need a mammogram at age 52 years or younger if you have an increased risk for breast cancer. Talk to your healthcare provider about when you should start having mammograms and how often you need them. Vaccines you may need:   Get an influenza vaccine  every year. The influenza vaccine protects you from the flu. Several types of viruses cause the flu. The viruses change over time, so new vaccines are made each year. Get a tetanus-diphtheria (Td) booster vaccine  every 10 years. This vaccine protects you against tetanus and diphtheria. Tetanus is a severe infection that may cause painful muscle spasms and lockjaw. Diphtheria is a severe bacterial infection that causes a thick covering in the back of your mouth and throat.     Get a human papillomavirus (HPV) vaccine  if you are female and aged 23 to 32 or male 23 to 24 and never received it. This vaccine protects you from HPV infection. HPV is the most common infection spread by sexual contact. HPV may also cause vaginal, penile, and anal cancers. Get a pneumococcal vaccine  if you are aged 72 years or older. The pneumococcal vaccine is an injection given to protect you from pneumococcal disease. Pneumococcal disease is an infection caused by pneumococcal bacteria. The infection may cause pneumonia, meningitis, or an ear infection. Get a shingles vaccine  if you are 60 or older, even if you have had shingles before. The shingles vaccine is an injection to protect you from the varicella-zoster virus. This is the same virus that causes chickenpox. Shingles is a painful rash that develops in people who had chickenpox or have been exposed to the virus. How to eat healthy:  My Plate is a model for planning healthy meals. It shows the types and amounts of foods that should go on your plate. Fruits and vegetables make up about half of your plate, and grains and protein make up the other half. A serving of dairy is included on the side of your plate. The amount of calories and serving sizes you need depends on your age, gender, weight, and height. Examples of healthy foods are listed below:  Eat a variety of vegetables  such as dark green, red, and orange vegetables. You can also include canned vegetables low in sodium (salt) and frozen vegetables without added butter or sauces. Eat a variety of fresh fruits , canned fruit in 100% juice, frozen fruit, and dried fruit. Include whole grains. At least half of the grains you eat should be whole grains. Examples include whole-wheat bread, wheat pasta, brown rice, and whole-grain cereals such as oatmeal.    Eat a variety of protein foods such as seafood (fish and shellfish), lean meat, and poultry without skin (turkey and chicken).  Examples of lean meats include pork leg, shoulder, or tenderloin, and beef round, sirloin, tenderloin, and extra lean ground beef. Other protein foods include eggs and egg substitutes, beans, peas, soy products, nuts, and seeds. Choose low-fat dairy products such as skim or 1% milk or low-fat yogurt, cheese, and cottage cheese. Limit unhealthy fats  such as butter, hard margarine, and shortening. Exercise:  Exercise at least 30 minutes per day on most days of the week. Some examples of exercise include walking, biking, dancing, and swimming. You can also fit in more physical activity by taking the stairs instead of the elevator or parking farther away from stores. Include muscle strengthening activities 2 days each week. Regular exercise provides many health benefits. It helps you manage your weight, and decreases your risk for type 2 diabetes, heart disease, stroke, and high blood pressure. Exercise can also help improve your mood. Ask your healthcare provider about the best exercise plan for you. General health and safety guidelines:   Do not smoke. Nicotine and other chemicals in cigarettes and cigars can cause lung damage. Ask your healthcare provider for information if you currently smoke and need help to quit. E-cigarettes or smokeless tobacco still contain nicotine. Talk to your healthcare provider before you use these products. Limit alcohol. A drink of alcohol is 12 ounces of beer, 5 ounces of wine, or 1½ ounces of liquor. Lose weight, if needed. Being overweight increases your risk of certain health conditions. These include heart disease, high blood pressure, type 2 diabetes, and certain types of cancer. Protect your skin. Do not sunbathe or use tanning beds. Use sunscreen with a SPF 15 or higher. Apply sunscreen at least 15 minutes before you go outside. Reapply sunscreen every 2 hours. Wear protective clothing, hats, and sunglasses when you are outside. Drive safely. Always wear your seatbelt. Make sure everyone in your car wears a seatbelt.  A seatbelt can save your life if you are in an accident. Do not use your cell phone when you are driving. This could distract you and cause an accident. Pull over if you need to make a call or send a text message. Practice safe sex. Use latex condoms if are sexually active and have more than one partner. Your healthcare provider may recommend screening tests for sexually transmitted infections (STIs). Wear helmets, lifejackets, and protective gear. Always wear a helmet when you ride a bike or motorcycle, go skiing, or play sports that could cause a head injury. Wear protective equipment when you play sports. Wear a lifejacket when you are on a boat or doing water sports. © Copyright Jeanne Ranks 2022 Information is for End User's use only and may not be sold, redistributed or otherwise used for commercial purposes. The above information is an  only. It is not intended as medical advice for individual conditions or treatments. Talk to your doctor, nurse or pharmacist before following any medical regimen to see if it is safe and effective for you.

## 2023-09-07 NOTE — ASSESSMENT & PLAN NOTE
CPE done. Last Tdap was in 2016. Pt declined flu shot and COVID vaccines. Will check labs. Pt advised to follow a well balanced, heart healthy diet and to exercise on a regular basis. Not a smoker. BP good.

## 2023-09-07 NOTE — PROGRESS NOTES
BMI Counseling: Body mass index is 28.55 kg/m². The BMI is above normal. Nutrition recommendations include decreasing portion sizes, encouraging healthy choices of fruits and vegetables, consuming healthier snacks and moderation in carbohydrate intake. Exercise recommendations include exercising 3-5 times per week. No pharmacotherapy was ordered. Rationale for BMI follow-up plan is due to patient being overweight or obese. Depression Screening and Follow-up Plan: Patient was screened for depression during today's encounter. They screened negative with a PHQ-2 score of 0. Assessment/Plan:         Problem List Items Addressed This Visit        Other    Encounter for annual physical exam - Primary     CPE done. Last Tdap was in 2016. Pt declined flu shot and COVID vaccines. Will check labs. Pt advised to follow a well balanced, heart healthy diet and to exercise on a regular basis. Not a smoker. BP good. Chronic bilateral low back pain without sciatica     Likely muscular. Back exercises given. Other Visit Diagnoses     Screening for cardiovascular condition        Relevant Orders    Lipid panel    Screening for diabetes mellitus        Relevant Orders    Comprehensive metabolic panel    Exam for population survey        Relevant Orders    Lipid panel    Comprehensive metabolic panel    CBC and differential            Subjective:      Patient ID: Evaristo Hines is a 39 y.o. male. Patient here for physical. Doing well. No chest pain or shortness of breath. No headaches. No abdominal pain. Last Tdap was in 2016. Doesn't want flu shot or COVID vaccines. Gets low back pain at times. No regular exercise. Not a smoker. Has a few drinks per week. No other complaints. Back Pain  This is a recurrent problem. The current episode started more than 1 month ago. The problem occurs daily. The problem has been waxing and waning since onset. The pain is present in the lumbar spine.  The quality of the pain is described as aching. The pain does not radiate. The pain is at a severity of 4/10. The pain is worse during the day. The symptoms are aggravated by sitting. Stiffness is present in the morning. Pertinent negatives include no abdominal pain, bladder incontinence, bowel incontinence, chest pain, dysuria, fever, headaches, leg pain, numbness, paresis, paresthesias, pelvic pain, perianal numbness, tingling, weakness or weight loss. Risk factors include poor posture and sedentary lifestyle. The following portions of the patient's history were reviewed and updated as appropriate:   Past Medical History:  He has a past medical history of Anxiety, Epidermal inclusion cyst, Hypercholesterolemia (08/20/2008), and Prehypertension. ,  _______________________________________________________________________  Medical Problems:  does not have any pertinent problems on file.,  _______________________________________________________________________  Past Surgical History:   has no past surgical history on file.,  _______________________________________________________________________  Family History:  family history includes Cancer in his paternal grandfather; Colon cancer in his paternal grandfather; Hypertension in his father and mother.,  _______________________________________________________________________  Social History:   reports that he quit smoking about 16 years ago. His smoking use included cigarettes. He has a 2.50 pack-year smoking history. He has never used smokeless tobacco. He reports current alcohol use. He reports that he does not use drugs. ,  _______________________________________________________________________  Allergies:  is allergic to sesame seed (diagnostic) - food allergy. .  _______________________________________________________________________  No current outpatient medications on file. No current facility-administered medications for this visit. _______________________________________________________________________  Review of Systems   Constitutional: Negative for activity change, appetite change, fatigue, fever, unexpected weight change and weight loss. HENT: Negative for congestion, ear pain, rhinorrhea, sore throat and trouble swallowing. Eyes: Negative for pain, discharge and visual disturbance. Respiratory: Negative for cough, shortness of breath and wheezing. Cardiovascular: Negative for chest pain, palpitations and leg swelling. Gastrointestinal: Negative for abdominal pain, bowel incontinence, constipation, diarrhea, nausea and vomiting. Endocrine: Negative for polydipsia, polyphagia and polyuria. Genitourinary: Negative for bladder incontinence, difficulty urinating, dysuria, hematuria and pelvic pain. Musculoskeletal: Positive for back pain. Negative for arthralgias, gait problem, myalgias and neck pain. Skin: Negative for color change and rash. Neurological: Negative for dizziness, tingling, weakness, numbness, headaches and paresthesias. Hematological: Negative for adenopathy. Does not bruise/bleed easily. Psychiatric/Behavioral: Negative for dysphoric mood and sleep disturbance. The patient is not nervous/anxious. Objective:  Vitals:    09/07/23 0903   BP: 130/82   BP Location: Left arm   Patient Position: Sitting   Cuff Size: Large   Pulse: 84   Resp: 20   Temp: 98.3 °F (36.8 °C)   TempSrc: Tympanic   SpO2: 97%   Weight: 90.3 kg (199 lb)   Height: 5' 10" (1.778 m)     Body mass index is 28.55 kg/m². Physical Exam  Vitals and nursing note reviewed. Constitutional:       Appearance: Normal appearance. He is well-developed. HENT:      Head: Normocephalic and atraumatic.       Right Ear: Tympanic membrane, ear canal and external ear normal.      Left Ear: Tympanic membrane, ear canal and external ear normal.      Nose: Nose normal.      Mouth/Throat:      Mouth: Mucous membranes are moist. Pharynx: Oropharynx is clear. No posterior oropharyngeal erythema. Eyes:      Conjunctiva/sclera: Conjunctivae normal.      Pupils: Pupils are equal, round, and reactive to light. Neck:      Thyroid: No thyromegaly. Cardiovascular:      Rate and Rhythm: Normal rate and regular rhythm. Heart sounds: Normal heart sounds. No murmur heard. Pulmonary:      Effort: Pulmonary effort is normal.      Breath sounds: Normal breath sounds. No wheezing or rales. Abdominal:      General: Bowel sounds are normal. There is no distension. Palpations: Abdomen is soft. There is no mass. Tenderness: There is no abdominal tenderness. Musculoskeletal:         General: No deformity. Normal range of motion. Cervical back: Normal range of motion and neck supple. Right lower leg: No edema. Left lower leg: No edema. Lymphadenopathy:      Cervical: No cervical adenopathy. Skin:     General: Skin is warm and dry. Capillary Refill: Capillary refill takes less than 2 seconds. Findings: No erythema or rash. Neurological:      General: No focal deficit present. Mental Status: He is alert and oriented to person, place, and time. Mental status is at baseline. Cranial Nerves: No cranial nerve deficit. Sensory: No sensory deficit. Motor: No abnormal muscle tone. Coordination: Coordination normal.   Psychiatric:         Mood and Affect: Mood normal.         Behavior: Behavior normal.         Thought Content:  Thought content normal.         Judgment: Judgment normal.

## 2024-03-08 ENCOUNTER — OFFICE VISIT (OUTPATIENT)
Dept: FAMILY MEDICINE CLINIC | Facility: CLINIC | Age: 44
End: 2024-03-08
Payer: COMMERCIAL

## 2024-03-08 VITALS
HEART RATE: 90 BPM | WEIGHT: 200 LBS | OXYGEN SATURATION: 98 % | HEIGHT: 70 IN | DIASTOLIC BLOOD PRESSURE: 84 MMHG | SYSTOLIC BLOOD PRESSURE: 132 MMHG | BODY MASS INDEX: 28.63 KG/M2 | TEMPERATURE: 97.7 F

## 2024-03-08 DIAGNOSIS — B35.6 TINEA CRURIS: Primary | ICD-10-CM

## 2024-03-08 PROCEDURE — 99213 OFFICE O/P EST LOW 20 MIN: CPT | Performed by: FAMILY MEDICINE

## 2024-03-08 RX ORDER — CLOTRIMAZOLE AND BETAMETHASONE DIPROPIONATE 10; .64 MG/G; MG/G
CREAM TOPICAL 2 TIMES DAILY
Qty: 45 G | Refills: 2 | Status: SHIPPED | OUTPATIENT
Start: 2024-03-08

## 2024-03-08 RX ORDER — FLUCONAZOLE 100 MG/1
100 TABLET ORAL DAILY
Qty: 10 TABLET | Refills: 0 | Status: SHIPPED | OUTPATIENT
Start: 2024-03-08 | End: 2024-03-18

## 2024-03-08 NOTE — ASSESSMENT & PLAN NOTE
Patient has moderate case and has had it for 3 months. Over the counter treatments have not helped. Will try diflucan for 10 days and lotrisone cream twice a day. If no better, will refer to Dermatology.

## 2024-03-08 NOTE — PROGRESS NOTES
Assessment/Plan:         Problem List Items Addressed This Visit        Musculoskeletal and Integument    Tinea cruris - Primary     Patient has moderate case and has had it for 3 months. Over the counter treatments have not helped. Will try diflucan for 10 days and lotrisone cream twice a day. If no better, will refer to Dermatology.         Relevant Medications    fluconazole (DIFLUCAN) 100 mg tablet    clotrimazole-betamethasone (LOTRISONE) 1-0.05 % cream         Subjective:      Patient ID: Jaren Montenegro is a 43 y.o. male.    Patient here for 3 month history of rash in groin area. Itchy at times. Using over the counter txs but not getting better. Never had it before.     Rash  Pertinent negatives include no diarrhea, fatigue, shortness of breath or vomiting.       The following portions of the patient's history were reviewed and updated as appropriate:   Past Medical History:  He has a past medical history of Allergic, Anxiety, Epidermal inclusion cyst, Hypercholesterolemia (08/20/2008), and Prehypertension.,  _______________________________________________________________________  Medical Problems:  does not have any pertinent problems on file.,  _______________________________________________________________________  Past Surgical History:   has no past surgical history on file.,  _______________________________________________________________________  Family History:  family history includes Cancer in his paternal grandfather; Colon cancer in his paternal grandfather; Hypertension in his father and mother.,  _______________________________________________________________________  Social History:   reports that he quit smoking about 17 years ago. His smoking use included cigarettes. He started smoking about 22 years ago. He has a 2.5 pack-year smoking history. He has never used smokeless tobacco. He reports current alcohol use of about 8.0 standard drinks of alcohol per week. He reports that he does not use  "drugs.,  _______________________________________________________________________  Allergies:  is allergic to sesame seed (diagnostic) - food allergy..  _______________________________________________________________________  Current Outpatient Medications   Medication Sig Dispense Refill   • clotrimazole-betamethasone (LOTRISONE) 1-0.05 % cream Apply topically 2 (two) times a day 45 g 2   • fluconazole (DIFLUCAN) 100 mg tablet Take 1 tablet (100 mg total) by mouth daily for 10 days 10 tablet 0     No current facility-administered medications for this visit.     _______________________________________________________________________  Review of Systems   Constitutional:  Negative for fatigue.   Respiratory:  Negative for shortness of breath.    Cardiovascular:  Negative for chest pain.   Gastrointestinal:  Negative for abdominal pain, constipation, diarrhea and vomiting.   Musculoskeletal:  Negative for arthralgias.   Skin:  Positive for rash.   Neurological:  Negative for headaches.         Objective:  Vitals:    03/08/24 1029   BP: 132/84   BP Location: Left arm   Patient Position: Sitting   Cuff Size: Standard   Pulse: 90   Temp: 97.7 °F (36.5 °C)   TempSrc: Tympanic   SpO2: 98%   Weight: 90.7 kg (200 lb)   Height: 5' 10\" (1.778 m)     Body mass index is 28.7 kg/m².     Physical Exam  Vitals and nursing note reviewed.   Constitutional:       Appearance: Normal appearance. He is well-developed and normal weight.   Neck:      Thyroid: No thyromegaly.   Cardiovascular:      Rate and Rhythm: Normal rate and regular rhythm.      Heart sounds: Normal heart sounds. No murmur heard.  Pulmonary:      Effort: Pulmonary effort is normal. No respiratory distress.      Breath sounds: Normal breath sounds. No wheezing.   Musculoskeletal:      Cervical back: Normal range of motion and neck supple.      Right lower leg: No edema.      Left lower leg: No edema.   Lymphadenopathy:      Cervical: No cervical adenopathy.   Skin:     " Comments: Erythematous flaky areas bilateral groin and penis   Neurological:      Mental Status: He is alert and oriented to person, place, and time.      Cranial Nerves: No cranial nerve deficit.   Psychiatric:         Mood and Affect: Mood normal.         Behavior: Behavior normal.         Thought Content: Thought content normal.         Judgment: Judgment normal.         Depression Screening and Follow-up Plan: Patient was screened for depression during today's encounter. They screened negative with a PHQ-2 score of 0.

## 2024-12-20 ENCOUNTER — OFFICE VISIT (OUTPATIENT)
Dept: FAMILY MEDICINE CLINIC | Facility: CLINIC | Age: 44
End: 2024-12-20
Payer: COMMERCIAL

## 2024-12-20 VITALS
DIASTOLIC BLOOD PRESSURE: 80 MMHG | SYSTOLIC BLOOD PRESSURE: 136 MMHG | BODY MASS INDEX: 28.77 KG/M2 | RESPIRATION RATE: 16 BRPM | HEART RATE: 76 BPM | HEIGHT: 70 IN | WEIGHT: 201 LBS | OXYGEN SATURATION: 98 %

## 2024-12-20 DIAGNOSIS — Z13.1 SCREENING FOR DIABETES MELLITUS: ICD-10-CM

## 2024-12-20 DIAGNOSIS — Z00.00 ENCOUNTER FOR ANNUAL PHYSICAL EXAM: Primary | ICD-10-CM

## 2024-12-20 DIAGNOSIS — Z00.00 PREVENTATIVE HEALTH CARE: ICD-10-CM

## 2024-12-20 DIAGNOSIS — Z13.6 SCREENING FOR CARDIOVASCULAR CONDITION: ICD-10-CM

## 2024-12-20 DIAGNOSIS — R05.2 SUBACUTE COUGH: ICD-10-CM

## 2024-12-20 DIAGNOSIS — Z12.11 SCREENING FOR COLON CANCER: ICD-10-CM

## 2024-12-20 PROCEDURE — 99396 PREV VISIT EST AGE 40-64: CPT | Performed by: FAMILY MEDICINE

## 2024-12-20 NOTE — ASSESSMENT & PLAN NOTE
CPE done. Last Tdap was in 2016. Pt declined flu shot and COVID vaccines. Will check labs. Dscussed screening for Colon Cancer. Pt advised to follow a well balanced, heart healthy diet and to exercise on a regular basis. Not a smoker. Blood pressure good.   Orders:  •  Lipid panel; Future  •  Comprehensive metabolic panel; Future  •  CBC and differential; Future

## 2024-12-20 NOTE — ASSESSMENT & PLAN NOTE
Patient has had it for past 1 month off and on and worse at night. Likely allergic in nature. Patient to try flonase or allegra. Call if no better .

## 2024-12-20 NOTE — PROGRESS NOTES
Name: Jaren Montenegro      : 1980      MRN: [id number]  Encounter Provider: Mauricio Smith MD  Encounter Date: 2024   Encounter department: [facility]  :  Assessment & Plan  Encounter for annual physical exam  CPE done. Last Tdap was in . Pt declined flu shot and COVID vaccines. Will check labs. Dscussed screening for Colon Cancer. Pt advised to follow a well balanced, heart healthy diet and to exercise on a regular basis. Not a smoker. Blood pressure good.   Orders:  •  Lipid panel; Future  •  Comprehensive metabolic panel; Future  •  CBC and differential; Future    Screening for cardiovascular condition    Orders:  •  Lipid panel; Future    Screening for diabetes mellitus    Orders:  •  Comprehensive metabolic panel; Future    Preventative health care    Orders:  •  Lipid panel; Future  •  Comprehensive metabolic panel; Future  •  CBC and differential; Future    Screening for colon cancer    Orders:  •  Cologuard    Subacute cough  Patient has had it for past 1 month off and on and worse at night. Likely allergic in nature. Patient to try flonase or allegra. Call if no better .               History of Present Illness     Patient here for physical. Doing ok. No chest pain or shortness of breath. No headaches. No abdominal pain. Patient has had cough at night for past 1 month. Has some mucus. Not a smoker. No sinus congestion. Has had symptoms in the past. Last Tdap was in . Doesn't get flu shot or COVID vaccines. No regular exercise. Not a smoker. Has a few drinks a week.     Cough  This is a recurrent problem. The current episode started 1 to 4 weeks ago. The problem has been unchanged. The problem occurs every few hours. The cough is Non-productive. Pertinent negatives include no chest pain, chills, ear congestion, ear pain, fever, headaches, heartburn, hemoptysis, myalgias, nasal congestion, postnasal drip, rash, rhinorrhea, sore throat, shortness of breath,  "sweats, weight loss or wheezing. Nothing aggravates the symptoms.     Review of Systems   Constitutional:  Negative for activity change, appetite change, chills, fatigue, fever, unexpected weight change and weight loss.   HENT:  Negative for congestion, ear pain, postnasal drip, rhinorrhea, sore throat and trouble swallowing.    Eyes:  Negative for pain, discharge and visual disturbance.   Respiratory:  Positive for cough. Negative for hemoptysis, shortness of breath and wheezing.    Cardiovascular:  Negative for chest pain, palpitations and leg swelling.   Gastrointestinal:  Negative for abdominal pain, constipation, diarrhea, heartburn, nausea and vomiting.   Endocrine: Negative for polydipsia, polyphagia and polyuria.   Genitourinary:  Negative for difficulty urinating and hematuria.   Musculoskeletal:  Negative for arthralgias, back pain, gait problem, myalgias and neck pain.   Skin:  Negative for color change and rash.   Neurological:  Negative for dizziness, weakness and headaches.   Hematological:  Negative for adenopathy. Does not bruise/bleed easily.   Psychiatric/Behavioral:  Negative for dysphoric mood and sleep disturbance. The patient is not nervous/anxious.        Objective   /80 (BP Location: Left arm, Patient Position: Sitting, Cuff Size: Standard)   Pulse 76   Resp 16   Ht 5' 10\" (1.778 m)   Wt 91.2 kg (201 lb)   SpO2 98%   BMI 28.84 kg/m²      Physical Exam  Vitals and nursing note reviewed.   Constitutional:       General: He is not in acute distress.     Appearance: Normal appearance. He is well-developed. He is not ill-appearing.   HENT:      Head: Normocephalic and atraumatic.      Right Ear: Tympanic membrane, ear canal and external ear normal.      Left Ear: Tympanic membrane, ear canal and external ear normal.      Nose: Nose normal. No congestion or rhinorrhea.      Mouth/Throat:      Mouth: Mucous membranes are moist.      Pharynx: Oropharynx is clear. No posterior " oropharyngeal erythema.   Eyes:      General:         Right eye: No discharge.      Conjunctiva/sclera: Conjunctivae normal.      Pupils: Pupils are equal, round, and reactive to light.   Neck:      Thyroid: No thyromegaly.   Cardiovascular:      Rate and Rhythm: Normal rate and regular rhythm.      Heart sounds: No murmur heard.  Pulmonary:      Effort: Pulmonary effort is normal. No respiratory distress.      Breath sounds: Normal breath sounds. No wheezing.   Abdominal:      General: Bowel sounds are normal.      Palpations: Abdomen is soft. There is no mass.      Tenderness: There is no abdominal tenderness.   Musculoskeletal:         General: No swelling or deformity. Normal range of motion.      Cervical back: Normal range of motion and neck supple. No tenderness.      Right lower leg: No edema.      Left lower leg: No edema.   Lymphadenopathy:      Cervical: No cervical adenopathy.   Skin:     General: Skin is warm and dry.      Capillary Refill: Capillary refill takes less than 2 seconds.      Findings: No erythema or rash.   Neurological:      General: No focal deficit present.      Mental Status: He is alert and oriented to person, place, and time.      Sensory: No sensory deficit.   Psychiatric:         Mood and Affect: Mood normal.         Behavior: Behavior normal.         Thought Content: Thought content normal.         Judgment: Judgment normal.

## 2025-01-11 LAB — COLOGUARD RESULT REPORTABLE: NORMAL

## 2025-01-19 PROBLEM — R05.2 SUBACUTE COUGH: Status: RESOLVED | Noted: 2024-12-20 | Resolved: 2025-01-19

## 2025-03-05 LAB — COLOGUARD RESULT REPORTABLE: NEGATIVE

## 2025-03-06 ENCOUNTER — RESULTS FOLLOW-UP (OUTPATIENT)
Dept: FAMILY MEDICINE CLINIC | Facility: CLINIC | Age: 45
End: 2025-03-06

## 2025-04-23 ENCOUNTER — TELEMEDICINE (OUTPATIENT)
Dept: OTHER | Facility: HOSPITAL | Age: 45
End: 2025-04-23
Payer: COMMERCIAL

## 2025-04-23 DIAGNOSIS — J01.00 ACUTE NON-RECURRENT MAXILLARY SINUSITIS: Primary | ICD-10-CM

## 2025-04-23 PROCEDURE — 99213 OFFICE O/P EST LOW 20 MIN: CPT | Performed by: PHYSICIAN ASSISTANT

## 2025-04-23 NOTE — PROGRESS NOTES
Virtual Regular VisitName: Jaren Montenegro      : 1980      MRN: [id number]  Encounter Provider: Soraida Castro PA-C  Encounter Date: 2025   Encounter department: VIRTUAL CARE   :  Assessment & Plan  Acute non-recurrent maxillary sinusitis    Orders:    amoxicillin-clavulanate (AUGMENTIN) 875-125 mg per tablet; Take 1 tablet by mouth every 12 (twelve) hours for 10 days        History of Present Illness     Patient states that he has had a bad sore throat since last . He had cold/flu symptoms and was taking mucinex but he is having a lingering sore throat.  He feels he has mucus sitting in the back of his throat.  It has been 10 days.       Review of Systems   Constitutional: Negative.    HENT:  Positive for congestion, postnasal drip and sore throat.    Respiratory: Negative.     Cardiovascular: Negative.    Neurological: Negative.    Psychiatric/Behavioral: Negative.         Objective   There were no vitals taken for this visit.    Physical Exam  Constitutional:       General: He is not in acute distress.     Appearance: Normal appearance. He is not ill-appearing, toxic-appearing or diaphoretic.   HENT:      Head: Normocephalic and atraumatic.      Nose: Congestion present.   Pulmonary:      Effort: Pulmonary effort is normal. No respiratory distress.   Lymphadenopathy:      Cervical: Cervical adenopathy present.   Skin:     General: Skin is dry.   Neurological:      General: No focal deficit present.      Mental Status: He is alert and oriented to person, place, and time.   Psychiatric:         Mood and Affect: Mood normal.         Behavior: Behavior normal.         Administrative Statements   Encounter provider Soraida Castro PA-C    The Patient is located at Home and in the following state in which I hold an active license [place].    The patient was identified by name and date of birth. Jaren Montenegro was informed that this is a telemedicine visit and that the visit is being conducted through the  [employer] Embedded platform. He agrees to proceed..  My office door was closed. No one else was in the room.  He acknowledged consent and understanding of privacy and security of the video platform. The patient has agreed to participate and understands they can discontinue the visit at any time.    I have spent a total time of 5 minutes in caring for this patient on the day of the visit/encounter including Documenting in the medical record, Reviewing/placing orders in the medical record (including tests, medications, and/or procedures), and Obtaining or reviewing history  , not including the time spent for establishing the audio/video connection.